# Patient Record
Sex: FEMALE | Race: BLACK OR AFRICAN AMERICAN | NOT HISPANIC OR LATINO | Employment: OTHER | URBAN - NONMETROPOLITAN AREA
[De-identification: names, ages, dates, MRNs, and addresses within clinical notes are randomized per-mention and may not be internally consistent; named-entity substitution may affect disease eponyms.]

---

## 2021-11-12 ENCOUNTER — OFFICE VISIT (OUTPATIENT)
Dept: FAMILY MEDICINE | Facility: CLINIC | Age: 46
End: 2021-11-12
Payer: COMMERCIAL

## 2021-11-12 VITALS
HEART RATE: 73 BPM | SYSTOLIC BLOOD PRESSURE: 82 MMHG | BODY MASS INDEX: 23.7 KG/M2 | TEMPERATURE: 99 F | HEIGHT: 69 IN | RESPIRATION RATE: 96 BRPM | DIASTOLIC BLOOD PRESSURE: 50 MMHG | WEIGHT: 160 LBS

## 2021-11-12 DIAGNOSIS — J02.9 SORE THROAT: ICD-10-CM

## 2021-11-12 DIAGNOSIS — J22 LOWER RESPIRATORY INFECTION: Primary | ICD-10-CM

## 2021-11-12 DIAGNOSIS — Z20.822 EXPOSURE TO COVID-19 VIRUS: ICD-10-CM

## 2021-11-12 DIAGNOSIS — Z20.822 ENCOUNTER FOR LABORATORY TESTING FOR COVID-19 VIRUS: ICD-10-CM

## 2021-11-12 DIAGNOSIS — R52 GENERALIZED BODY ACHES: ICD-10-CM

## 2021-11-12 LAB
CTP QC/QA: YES
CTP QC/QA: YES
FLUAV AG NPH QL: NEGATIVE
FLUBV AG NPH QL: NEGATIVE
S PYO RRNA THROAT QL PROBE: NEGATIVE
SARS-COV-2 AG RESP QL IA.RAPID: NEGATIVE

## 2021-11-12 PROCEDURE — 99203 PR OFFICE/OUTPT VISIT, NEW, LEVL III, 30-44 MIN: ICD-10-PCS | Mod: 25,,, | Performed by: FAMILY MEDICINE

## 2021-11-12 PROCEDURE — 3078F PR MOST RECENT DIASTOLIC BLOOD PRESSURE < 80 MM HG: ICD-10-PCS | Mod: CPTII,,, | Performed by: FAMILY MEDICINE

## 2021-11-12 PROCEDURE — 87428 POCT SARS-COV2 (COVID) WITH FLU ANTIGEN: ICD-10-PCS | Mod: QW,,, | Performed by: FAMILY MEDICINE

## 2021-11-12 PROCEDURE — 99203 OFFICE O/P NEW LOW 30 MIN: CPT | Mod: 25,,, | Performed by: FAMILY MEDICINE

## 2021-11-12 PROCEDURE — 3074F SYST BP LT 130 MM HG: CPT | Mod: CPTII,,, | Performed by: FAMILY MEDICINE

## 2021-11-12 PROCEDURE — 87880 STREP A ASSAY W/OPTIC: CPT | Mod: QW,,, | Performed by: FAMILY MEDICINE

## 2021-11-12 PROCEDURE — 3074F PR MOST RECENT SYSTOLIC BLOOD PRESSURE < 130 MM HG: ICD-10-PCS | Mod: CPTII,,, | Performed by: FAMILY MEDICINE

## 2021-11-12 PROCEDURE — 3008F BODY MASS INDEX DOCD: CPT | Mod: CPTII,,, | Performed by: FAMILY MEDICINE

## 2021-11-12 PROCEDURE — 96372 PR INJECTION,THERAP/PROPH/DIAG2ST, IM OR SUBCUT: ICD-10-PCS | Mod: ,,, | Performed by: FAMILY MEDICINE

## 2021-11-12 PROCEDURE — 87428 SARSCOV & INF VIR A&B AG IA: CPT | Mod: QW,,, | Performed by: FAMILY MEDICINE

## 2021-11-12 PROCEDURE — 87635 SARS-COV-2 (COVID-19) QUALITATIVE PCR: ICD-10-PCS | Mod: ,,, | Performed by: CLINICAL MEDICAL LABORATORY

## 2021-11-12 PROCEDURE — 87880 POCT RAPID STREP A: ICD-10-PCS | Mod: QW,,, | Performed by: FAMILY MEDICINE

## 2021-11-12 PROCEDURE — 1159F MED LIST DOCD IN RCRD: CPT | Mod: CPTII,,, | Performed by: FAMILY MEDICINE

## 2021-11-12 PROCEDURE — 87635 SARS-COV-2 COVID-19 AMP PRB: CPT | Mod: ,,, | Performed by: CLINICAL MEDICAL LABORATORY

## 2021-11-12 PROCEDURE — 1160F PR REVIEW ALL MEDS BY PRESCRIBER/CLIN PHARMACIST DOCUMENTED: ICD-10-PCS | Mod: CPTII,,, | Performed by: FAMILY MEDICINE

## 2021-11-12 PROCEDURE — 1160F RVW MEDS BY RX/DR IN RCRD: CPT | Mod: CPTII,,, | Performed by: FAMILY MEDICINE

## 2021-11-12 PROCEDURE — 3078F DIAST BP <80 MM HG: CPT | Mod: CPTII,,, | Performed by: FAMILY MEDICINE

## 2021-11-12 PROCEDURE — 3044F HG A1C LEVEL LT 7.0%: CPT | Mod: CPTII,,, | Performed by: FAMILY MEDICINE

## 2021-11-12 PROCEDURE — 3044F PR MOST RECENT HEMOGLOBIN A1C LEVEL <7.0%: ICD-10-PCS | Mod: CPTII,,, | Performed by: FAMILY MEDICINE

## 2021-11-12 PROCEDURE — 96372 THER/PROPH/DIAG INJ SC/IM: CPT | Mod: ,,, | Performed by: FAMILY MEDICINE

## 2021-11-12 PROCEDURE — 3008F PR BODY MASS INDEX (BMI) DOCUMENTED: ICD-10-PCS | Mod: CPTII,,, | Performed by: FAMILY MEDICINE

## 2021-11-12 PROCEDURE — 1159F PR MEDICATION LIST DOCUMENTED IN MEDICAL RECORD: ICD-10-PCS | Mod: CPTII,,, | Performed by: FAMILY MEDICINE

## 2021-11-12 RX ORDER — PREDNISONE 20 MG/1
20 TABLET ORAL 2 TIMES DAILY
Qty: 14 TABLET | Refills: 0 | Status: SHIPPED | OUTPATIENT
Start: 2021-11-12 | End: 2023-09-13

## 2021-11-12 RX ORDER — METHYLPREDNISOLONE ACETATE 40 MG/ML
40 INJECTION, SUSPENSION INTRA-ARTICULAR; INTRALESIONAL; INTRAMUSCULAR; SOFT TISSUE
Status: COMPLETED | OUTPATIENT
Start: 2021-11-12 | End: 2021-11-12

## 2021-11-12 RX ORDER — DEXCHLORPHENIRAMINE MALEATE, DEXTROMETHORPHAN HBR, PHENYLEPHRINE HCL 1; 10; 5 MG/5ML; MG/5ML; MG/5ML
5 SYRUP ORAL 3 TIMES DAILY PRN
Qty: 120 ML | Refills: 0 | Status: SHIPPED | OUTPATIENT
Start: 2021-11-12 | End: 2023-02-27

## 2021-11-12 RX ORDER — DEXAMETHASONE SODIUM PHOSPHATE 4 MG/ML
4 INJECTION, SOLUTION INTRA-ARTICULAR; INTRALESIONAL; INTRAMUSCULAR; INTRAVENOUS; SOFT TISSUE
Status: COMPLETED | OUTPATIENT
Start: 2021-11-12 | End: 2021-11-12

## 2021-11-12 RX ORDER — ALBUTEROL SULFATE 90 UG/1
2 AEROSOL, METERED RESPIRATORY (INHALATION) EVERY 6 HOURS PRN
Qty: 18 G | Refills: 0 | Status: SHIPPED | OUTPATIENT
Start: 2021-11-12 | End: 2023-02-27 | Stop reason: SDUPTHER

## 2021-11-12 RX ORDER — LEVOFLOXACIN 500 MG/1
500 TABLET, FILM COATED ORAL DAILY
Qty: 7 TABLET | Refills: 0 | Status: SHIPPED | OUTPATIENT
Start: 2021-11-12 | End: 2023-09-13

## 2021-11-12 RX ORDER — CEFTRIAXONE 1 G/1
1 INJECTION, POWDER, FOR SOLUTION INTRAMUSCULAR; INTRAVENOUS
Status: COMPLETED | OUTPATIENT
Start: 2021-11-12 | End: 2021-11-12

## 2021-11-12 RX ADMIN — CEFTRIAXONE 1 G: 1 INJECTION, POWDER, FOR SOLUTION INTRAMUSCULAR; INTRAVENOUS at 10:11

## 2021-11-12 RX ADMIN — DEXAMETHASONE SODIUM PHOSPHATE 4 MG: 4 INJECTION, SOLUTION INTRA-ARTICULAR; INTRALESIONAL; INTRAMUSCULAR; INTRAVENOUS; SOFT TISSUE at 10:11

## 2021-11-12 RX ADMIN — METHYLPREDNISOLONE ACETATE 40 MG: 40 INJECTION, SUSPENSION INTRA-ARTICULAR; INTRALESIONAL; INTRAMUSCULAR; SOFT TISSUE at 10:11

## 2021-11-13 LAB — SARS-COV-2 RNA RESP QL NAA+PROBE: NEGATIVE

## 2022-07-25 ENCOUNTER — OFFICE VISIT (OUTPATIENT)
Dept: FAMILY MEDICINE | Facility: CLINIC | Age: 47
End: 2022-07-25
Payer: COMMERCIAL

## 2022-07-25 VITALS
HEART RATE: 78 BPM | TEMPERATURE: 98 F | BODY MASS INDEX: 20.88 KG/M2 | WEIGHT: 141 LBS | RESPIRATION RATE: 16 BRPM | OXYGEN SATURATION: 98 % | DIASTOLIC BLOOD PRESSURE: 82 MMHG | SYSTOLIC BLOOD PRESSURE: 127 MMHG | HEIGHT: 69 IN

## 2022-07-25 DIAGNOSIS — Z20.822 COUGH WITH EXPOSURE TO COVID-19 VIRUS: ICD-10-CM

## 2022-07-25 DIAGNOSIS — R05.8 COUGH WITH EXPOSURE TO COVID-19 VIRUS: ICD-10-CM

## 2022-07-25 DIAGNOSIS — R09.81 SINUS CONGESTION: ICD-10-CM

## 2022-07-25 DIAGNOSIS — G43.809 OTHER MIGRAINE WITHOUT STATUS MIGRAINOSUS, NOT INTRACTABLE: Primary | ICD-10-CM

## 2022-07-25 LAB
CTP QC/QA: YES
FLUAV AG NPH QL: NEGATIVE
FLUBV AG NPH QL: NEGATIVE
SARS-COV-2 AG RESP QL IA.RAPID: NEGATIVE

## 2022-07-25 PROCEDURE — 96372 THER/PROPH/DIAG INJ SC/IM: CPT | Mod: ,,, | Performed by: NURSE PRACTITIONER

## 2022-07-25 PROCEDURE — 1159F MED LIST DOCD IN RCRD: CPT | Mod: CPTII,,, | Performed by: NURSE PRACTITIONER

## 2022-07-25 PROCEDURE — 3008F BODY MASS INDEX DOCD: CPT | Mod: CPTII,,, | Performed by: NURSE PRACTITIONER

## 2022-07-25 PROCEDURE — 3079F PR MOST RECENT DIASTOLIC BLOOD PRESSURE 80-89 MM HG: ICD-10-PCS | Mod: CPTII,,, | Performed by: NURSE PRACTITIONER

## 2022-07-25 PROCEDURE — 3008F PR BODY MASS INDEX (BMI) DOCUMENTED: ICD-10-PCS | Mod: CPTII,,, | Performed by: NURSE PRACTITIONER

## 2022-07-25 PROCEDURE — 99213 PR OFFICE/OUTPT VISIT, EST, LEVL III, 20-29 MIN: ICD-10-PCS | Mod: 25,,, | Performed by: NURSE PRACTITIONER

## 2022-07-25 PROCEDURE — 87428 SARSCOV & INF VIR A&B AG IA: CPT | Mod: QW,,, | Performed by: NURSE PRACTITIONER

## 2022-07-25 PROCEDURE — 87428 POCT SARS-COV2 (COVID) WITH FLU ANTIGEN: ICD-10-PCS | Mod: QW,,, | Performed by: NURSE PRACTITIONER

## 2022-07-25 PROCEDURE — 3074F SYST BP LT 130 MM HG: CPT | Mod: CPTII,,, | Performed by: NURSE PRACTITIONER

## 2022-07-25 PROCEDURE — 1159F PR MEDICATION LIST DOCUMENTED IN MEDICAL RECORD: ICD-10-PCS | Mod: CPTII,,, | Performed by: NURSE PRACTITIONER

## 2022-07-25 PROCEDURE — 96372 PR INJECTION,THERAP/PROPH/DIAG2ST, IM OR SUBCUT: ICD-10-PCS | Mod: ,,, | Performed by: NURSE PRACTITIONER

## 2022-07-25 PROCEDURE — 3079F DIAST BP 80-89 MM HG: CPT | Mod: CPTII,,, | Performed by: NURSE PRACTITIONER

## 2022-07-25 PROCEDURE — 99213 OFFICE O/P EST LOW 20 MIN: CPT | Mod: 25,,, | Performed by: NURSE PRACTITIONER

## 2022-07-25 PROCEDURE — 3074F PR MOST RECENT SYSTOLIC BLOOD PRESSURE < 130 MM HG: ICD-10-PCS | Mod: CPTII,,, | Performed by: NURSE PRACTITIONER

## 2022-07-25 RX ORDER — DEXAMETHASONE SODIUM PHOSPHATE 4 MG/ML
4 INJECTION, SOLUTION INTRA-ARTICULAR; INTRALESIONAL; INTRAMUSCULAR; INTRAVENOUS; SOFT TISSUE
Status: COMPLETED | OUTPATIENT
Start: 2022-07-25 | End: 2022-07-25

## 2022-07-25 RX ORDER — LAMOTRIGINE 200 MG/1
200 TABLET ORAL
COMMUNITY
End: 2023-02-27

## 2022-07-25 RX ORDER — CITALOPRAM 20 MG/1
20 TABLET, FILM COATED ORAL
COMMUNITY

## 2022-07-25 RX ORDER — KETOROLAC TROMETHAMINE 30 MG/ML
60 INJECTION, SOLUTION INTRAMUSCULAR; INTRAVENOUS
Status: COMPLETED | OUTPATIENT
Start: 2022-07-25 | End: 2022-07-25

## 2022-07-25 RX ORDER — CYCLOBENZAPRINE HCL 10 MG
10 TABLET ORAL
COMMUNITY
Start: 2022-06-29 | End: 2023-02-27

## 2022-07-25 RX ORDER — METHYLPREDNISOLONE ACETATE 40 MG/ML
40 INJECTION, SUSPENSION INTRA-ARTICULAR; INTRALESIONAL; INTRAMUSCULAR; SOFT TISSUE
Status: COMPLETED | OUTPATIENT
Start: 2022-07-25 | End: 2022-07-25

## 2022-07-25 RX ORDER — DEXTROAMPHETAMINE SACCHARATE, AMPHETAMINE ASPARTATE, DEXTROAMPHETAMINE SULFATE AND AMPHETAMINE SULFATE 7.5; 7.5; 7.5; 7.5 MG/1; MG/1; MG/1; MG/1
30 TABLET ORAL
COMMUNITY

## 2022-07-25 RX ADMIN — KETOROLAC TROMETHAMINE 60 MG: 30 INJECTION, SOLUTION INTRAMUSCULAR; INTRAVENOUS at 09:07

## 2022-07-25 RX ADMIN — DEXAMETHASONE SODIUM PHOSPHATE 4 MG: 4 INJECTION, SOLUTION INTRA-ARTICULAR; INTRALESIONAL; INTRAMUSCULAR; INTRAVENOUS; SOFT TISSUE at 09:07

## 2022-07-25 RX ADMIN — METHYLPREDNISOLONE ACETATE 40 MG: 40 INJECTION, SUSPENSION INTRA-ARTICULAR; INTRALESIONAL; INTRAMUSCULAR; SOFT TISSUE at 09:07

## 2023-02-27 ENCOUNTER — PATIENT MESSAGE (OUTPATIENT)
Dept: FAMILY MEDICINE | Facility: CLINIC | Age: 48
End: 2023-02-27
Payer: MEDICARE

## 2023-02-27 ENCOUNTER — OFFICE VISIT (OUTPATIENT)
Dept: FAMILY MEDICINE | Facility: CLINIC | Age: 48
End: 2023-02-27
Payer: MEDICARE

## 2023-02-27 VITALS
HEART RATE: 96 BPM | SYSTOLIC BLOOD PRESSURE: 121 MMHG | BODY MASS INDEX: 18.37 KG/M2 | HEIGHT: 69 IN | DIASTOLIC BLOOD PRESSURE: 75 MMHG | WEIGHT: 124 LBS | OXYGEN SATURATION: 98 %

## 2023-02-27 DIAGNOSIS — Z79.899 DRUG-INDUCED IMMUNODEFICIENCY: Chronic | ICD-10-CM

## 2023-02-27 DIAGNOSIS — F31.0 BIPOLAR DISORDER, MOST RECENT EPISODE HYPOMANIC: Chronic | ICD-10-CM

## 2023-02-27 DIAGNOSIS — Z11.4 SCREENING FOR HIV (HUMAN IMMUNODEFICIENCY VIRUS): ICD-10-CM

## 2023-02-27 DIAGNOSIS — R63.4 ABNORMAL WEIGHT LOSS: Primary | Chronic | ICD-10-CM

## 2023-02-27 DIAGNOSIS — Z87.891 HISTORY OF TOBACCO USE: ICD-10-CM

## 2023-02-27 DIAGNOSIS — M06.09 RHEUMATOID ARTHRITIS OF MULTIPLE SITES WITH NEGATIVE RHEUMATOID FACTOR: Chronic | ICD-10-CM

## 2023-02-27 DIAGNOSIS — F17.200 TOBACCO DEPENDENCE SYNDROME: ICD-10-CM

## 2023-02-27 DIAGNOSIS — D84.821 DRUG-INDUCED IMMUNODEFICIENCY: Chronic | ICD-10-CM

## 2023-02-27 DIAGNOSIS — Z11.59 ENCOUNTER FOR HEPATITIS C SCREENING TEST FOR LOW RISK PATIENT: ICD-10-CM

## 2023-02-27 PROBLEM — Z51.81 THERAPEUTIC DRUG MONITORING: Chronic | Status: ACTIVE | Noted: 2022-06-29

## 2023-02-27 PROBLEM — K11.7 XEROSTOMIA: Chronic | Status: ACTIVE | Noted: 2023-02-07

## 2023-02-27 PROBLEM — G89.4 CHRONIC PAIN SYNDROME: Status: ACTIVE | Noted: 2022-06-29

## 2023-02-27 PROBLEM — Z51.81 THERAPEUTIC DRUG MONITORING: Status: ACTIVE | Noted: 2022-06-29

## 2023-02-27 PROBLEM — M79.7 FIBROMYALGIA: Status: ACTIVE | Noted: 2022-06-29

## 2023-02-27 PROBLEM — J22 LOWER RESPIRATORY INFECTION: Status: RESOLVED | Noted: 2021-11-12 | Resolved: 2023-02-27

## 2023-02-27 PROBLEM — K11.7 XEROSTOMIA: Status: ACTIVE | Noted: 2023-02-07

## 2023-02-27 PROBLEM — M79.7 FIBROMYALGIA: Chronic | Status: ACTIVE | Noted: 2022-06-29

## 2023-02-27 PROBLEM — Z20.822 EXPOSURE TO COVID-19 VIRUS: Status: RESOLVED | Noted: 2021-11-12 | Resolved: 2023-02-27

## 2023-02-27 PROBLEM — G89.4 CHRONIC PAIN SYNDROME: Chronic | Status: ACTIVE | Noted: 2022-06-29

## 2023-02-27 PROBLEM — M13.0 POLYARTHRITIS: Chronic | Status: ACTIVE | Noted: 2022-06-29

## 2023-02-27 PROBLEM — M13.0 POLYARTHRITIS: Status: ACTIVE | Noted: 2022-06-29

## 2023-02-27 PROBLEM — M25.50 PAIN IN JOINTS: Status: ACTIVE | Noted: 2022-06-29

## 2023-02-27 PROBLEM — M25.50 PAIN IN JOINTS: Chronic | Status: ACTIVE | Noted: 2022-06-29

## 2023-02-27 LAB
BASOPHILS # BLD AUTO: 0.04 K/UL (ref 0–0.2)
BASOPHILS NFR BLD AUTO: 0.4 % (ref 0–1)
BILIRUB UR QL STRIP: NEGATIVE
CLARITY UR: CLEAR
COLOR UR: NORMAL
DIFFERENTIAL METHOD BLD: ABNORMAL
EOSINOPHIL # BLD AUTO: 0.02 K/UL (ref 0–0.5)
EOSINOPHIL NFR BLD AUTO: 0.2 % (ref 1–4)
ERYTHROCYTE [DISTWIDTH] IN BLOOD BY AUTOMATED COUNT: 13.5 % (ref 11.5–14.5)
GLUCOSE UR STRIP-MCNC: NORMAL MG/DL
HCT VFR BLD AUTO: 41.1 % (ref 38–47)
HGB BLD-MCNC: 13.5 G/DL (ref 12–16)
IMM GRANULOCYTES # BLD AUTO: 0.02 K/UL (ref 0–0.04)
IMM GRANULOCYTES NFR BLD: 0.2 % (ref 0–0.4)
KETONES UR STRIP-SCNC: NEGATIVE MG/DL
LEUKOCYTE ESTERASE UR QL STRIP: NEGATIVE
LYMPHOCYTES # BLD AUTO: 4.74 K/UL (ref 1–4.8)
LYMPHOCYTES NFR BLD AUTO: 48.9 % (ref 27–41)
MCH RBC QN AUTO: 32.8 PG (ref 27–31)
MCHC RBC AUTO-ENTMCNC: 32.8 G/DL (ref 32–36)
MCV RBC AUTO: 100 FL (ref 80–96)
MONOCYTES # BLD AUTO: 0.76 K/UL (ref 0–0.8)
MONOCYTES NFR BLD AUTO: 7.8 % (ref 2–6)
MPC BLD CALC-MCNC: 11.4 FL (ref 9.4–12.4)
NEUTROPHILS # BLD AUTO: 4.11 K/UL (ref 1.8–7.7)
NEUTROPHILS NFR BLD AUTO: 42.5 % (ref 53–65)
NITRITE UR QL STRIP: NEGATIVE
NRBC # BLD AUTO: 0 X10E3/UL
NRBC, AUTO (.00): 0 %
PH UR STRIP: 6.5 PH UNITS
PLATELET # BLD AUTO: 280 K/UL (ref 150–400)
PROT UR QL STRIP: NEGATIVE
RBC # BLD AUTO: 4.11 M/UL (ref 4.2–5.4)
RBC # UR STRIP: NEGATIVE /UL
SP GR UR STRIP: 1.01
UROBILINOGEN UR STRIP-ACNC: NORMAL MG/DL
WBC # BLD AUTO: 9.69 K/UL (ref 4.5–11)

## 2023-02-27 PROCEDURE — 87389 HIV 1 / 2 ANTIBODY: ICD-10-PCS | Mod: ,,, | Performed by: CLINICAL MEDICAL LABORATORY

## 2023-02-27 PROCEDURE — 81003 URINALYSIS, REFLEX TO URINE CULTURE: ICD-10-PCS | Mod: QW,,, | Performed by: CLINICAL MEDICAL LABORATORY

## 2023-02-27 PROCEDURE — 3008F BODY MASS INDEX DOCD: CPT | Mod: ,,, | Performed by: FAMILY MEDICINE

## 2023-02-27 PROCEDURE — 3074F PR MOST RECENT SYSTOLIC BLOOD PRESSURE < 130 MM HG: ICD-10-PCS | Mod: ,,, | Performed by: FAMILY MEDICINE

## 2023-02-27 PROCEDURE — 86803 HEPATITIS C AB TEST: CPT | Mod: ,,, | Performed by: CLINICAL MEDICAL LABORATORY

## 2023-02-27 PROCEDURE — 1160F PR REVIEW ALL MEDS BY PRESCRIBER/CLIN PHARMACIST DOCUMENTED: ICD-10-PCS | Mod: ,,, | Performed by: FAMILY MEDICINE

## 2023-02-27 PROCEDURE — 87522 HEPATITIS C REVRS TRNSCRPJ: CPT | Mod: 90,,, | Performed by: CLINICAL MEDICAL LABORATORY

## 2023-02-27 PROCEDURE — G0008 FLU VACCINE (QUAD) GREATER THAN OR EQUAL TO 3YO PRESERVATIVE FREE IM: ICD-10-PCS | Mod: ,,, | Performed by: FAMILY MEDICINE

## 2023-02-27 PROCEDURE — 90686 IIV4 VACC NO PRSV 0.5 ML IM: CPT | Mod: ,,, | Performed by: FAMILY MEDICINE

## 2023-02-27 PROCEDURE — 3078F DIAST BP <80 MM HG: CPT | Mod: ,,, | Performed by: FAMILY MEDICINE

## 2023-02-27 PROCEDURE — 87389 HIV-1 AG W/HIV-1&-2 AB AG IA: CPT | Mod: ,,, | Performed by: CLINICAL MEDICAL LABORATORY

## 2023-02-27 PROCEDURE — G0008 ADMIN INFLUENZA VIRUS VAC: HCPCS | Mod: ,,, | Performed by: FAMILY MEDICINE

## 2023-02-27 PROCEDURE — 3074F SYST BP LT 130 MM HG: CPT | Mod: ,,, | Performed by: FAMILY MEDICINE

## 2023-02-27 PROCEDURE — 99406 BEHAV CHNG SMOKING 3-10 MIN: CPT | Mod: ,,, | Performed by: FAMILY MEDICINE

## 2023-02-27 PROCEDURE — 80050 PR  GENERAL HEALTH PANEL: ICD-10-PCS | Mod: ,,, | Performed by: CLINICAL MEDICAL LABORATORY

## 2023-02-27 PROCEDURE — 81003 URINALYSIS AUTO W/O SCOPE: CPT | Mod: QW,,, | Performed by: CLINICAL MEDICAL LABORATORY

## 2023-02-27 PROCEDURE — 99214 PR OFFICE/OUTPT VISIT, EST, LEVL IV, 30-39 MIN: ICD-10-PCS | Mod: 25,,, | Performed by: FAMILY MEDICINE

## 2023-02-27 PROCEDURE — 86803 HEPATITIS C ANTIBODY: ICD-10-PCS | Mod: ,,, | Performed by: CLINICAL MEDICAL LABORATORY

## 2023-02-27 PROCEDURE — 3078F PR MOST RECENT DIASTOLIC BLOOD PRESSURE < 80 MM HG: ICD-10-PCS | Mod: ,,, | Performed by: FAMILY MEDICINE

## 2023-02-27 PROCEDURE — 1160F RVW MEDS BY RX/DR IN RCRD: CPT | Mod: ,,, | Performed by: FAMILY MEDICINE

## 2023-02-27 PROCEDURE — 1159F PR MEDICATION LIST DOCUMENTED IN MEDICAL RECORD: ICD-10-PCS | Mod: ,,, | Performed by: FAMILY MEDICINE

## 2023-02-27 PROCEDURE — 90686 FLU VACCINE (QUAD) GREATER THAN OR EQUAL TO 3YO PRESERVATIVE FREE IM: ICD-10-PCS | Mod: ,,, | Performed by: FAMILY MEDICINE

## 2023-02-27 PROCEDURE — 87522 HCV MONITOR, COBAS AMPLICOR: ICD-10-PCS | Mod: 90,,, | Performed by: CLINICAL MEDICAL LABORATORY

## 2023-02-27 PROCEDURE — 3008F PR BODY MASS INDEX (BMI) DOCUMENTED: ICD-10-PCS | Mod: ,,, | Performed by: FAMILY MEDICINE

## 2023-02-27 PROCEDURE — 99406 PR TOBACCO USE CESSATION INTERMEDIATE 3-10 MINUTES: ICD-10-PCS | Mod: ,,, | Performed by: FAMILY MEDICINE

## 2023-02-27 PROCEDURE — 1159F MED LIST DOCD IN RCRD: CPT | Mod: ,,, | Performed by: FAMILY MEDICINE

## 2023-02-27 PROCEDURE — 80050 GENERAL HEALTH PANEL: CPT | Mod: ,,, | Performed by: CLINICAL MEDICAL LABORATORY

## 2023-02-27 PROCEDURE — 99214 OFFICE O/P EST MOD 30 MIN: CPT | Mod: 25,,, | Performed by: FAMILY MEDICINE

## 2023-02-27 RX ORDER — DOXYCYCLINE HYCLATE 100 MG
TABLET ORAL
COMMUNITY
Start: 2022-10-06 | End: 2023-10-31

## 2023-02-27 RX ORDER — PRAZOSIN HYDROCHLORIDE 2 MG/1
2 CAPSULE ORAL
COMMUNITY
End: 2023-10-31

## 2023-02-27 RX ORDER — ETODOLAC 500 MG/1
TABLET, FILM COATED ORAL
COMMUNITY
Start: 2023-02-07 | End: 2023-10-31

## 2023-02-27 RX ORDER — FOLIC ACID 1 MG/1
1 TABLET ORAL
COMMUNITY
Start: 2023-02-07 | End: 2024-02-07

## 2023-02-27 RX ORDER — HYDROXYCHLOROQUINE SULFATE 200 MG/1
200 TABLET, FILM COATED ORAL
COMMUNITY
Start: 2023-02-07 | End: 2024-02-07

## 2023-02-27 RX ORDER — GABAPENTIN 300 MG/1
300 CAPSULE ORAL
COMMUNITY
Start: 2022-09-12 | End: 2023-09-13

## 2023-02-27 RX ORDER — PILOCARPINE HYDROCHLORIDE 5 MG/1
5 TABLET, FILM COATED ORAL
COMMUNITY
Start: 2023-02-07 | End: 2023-09-13

## 2023-02-27 RX ORDER — NABUMETONE 750 MG/1
750 TABLET, FILM COATED ORAL
COMMUNITY
Start: 2022-07-27 | End: 2023-09-13

## 2023-02-27 RX ORDER — BENZTROPINE MESYLATE 1 MG/1
TABLET ORAL
COMMUNITY
Start: 2023-01-19 | End: 2023-09-13

## 2023-02-27 RX ORDER — ROPINIROLE 0.5 MG/1
0.5 TABLET, FILM COATED ORAL
COMMUNITY
End: 2023-09-13

## 2023-02-27 NOTE — PROGRESS NOTES
Sp Ariza MD    [address] [place] [address]     PATIENT NAME: Ratna Amin  : 1975  DATE: 23  MRN: [id number]      Billing Provider: Sp Ariza MD  Level of Service: ID OFFICE/OUTPT VISIT, EST, LEVL IV, 30-39 MIN  Patient PCP Information       Provider PCP Type    Primary Doctor No General            Reason for Visit / Chief Complaint: Follow-up (Pt is here to discuss her lab results. She also wanted to see about getting a wellness check today. )       Update PCP  Update Chief Complaint         History of Present Illness / Problem Focused Workflow     Ratna Amin presents to the clinic with Follow-up (Pt is here to discuss her lab results. She also wanted to see about getting a wellness check today. )     Weight loss - she has lost about 40 pounds in the past 1 year, she states she is very stressed, has bipolar and is on adderal for focus    Fatigue - she feels tired all the time, started about 3 months after an illness, she tested positive for flu at that time, since then she has not felt normal     Rheum problems - she follows with Dr. Goldsmith in [place]     Diabetes - family history     Bipolar - she follows with Mayelin     [facility]    Review of Systems     Review of Systems   Constitutional:  Positive for activity change and unexpected weight change.   HENT:  Negative for hearing loss, rhinorrhea and trouble swallowing.    Eyes:  Positive for visual disturbance. Negative for discharge.   Respiratory:  Negative for chest tightness and wheezing.    Cardiovascular:  Negative for chest pain and palpitations.   Gastrointestinal:  Negative for blood in stool, constipation, diarrhea and vomiting.   Endocrine: Positive for polydipsia and polyuria.   Genitourinary:  Negative for difficulty urinating, dysuria, hematuria and menstrual problem.   Musculoskeletal:  Positive for arthralgias and neck pain. Negative for joint swelling.    Neurological:  Positive for weakness and headaches.   Psychiatric/Behavioral:  Positive for confusion and dysphoric mood.       Medical / Social / Family History     Past Medical History:   Diagnosis Date    Bipolar disorder        Past Surgical History:   Procedure Laterality Date    CHOLECYSTECTOMY      HYSTERECTOMY      TUBAL LIGATION         Social History  Ms.  reports that she has been smoking cigarettes. She has been smoking an average of 1 pack per day. She has never used smokeless tobacco. She reports that she does not currently use alcohol. She reports that she does not use drugs.    Family History  Ms.'s family history includes Cancer in her father and mother; Diabetes in her mother; Hypertension in her father; Mental illness in her father.    Medications and Allergies     Medications  Outpatient Medications Marked as Taking for the 2/27/23 encounter (Office Visit) with Sp Ariza MD   Medication Sig Dispense Refill    ARIPiprazole (ABILIFY) 400 mg SSRR injection (single-use vial) Inject 400 mg into the muscle.      benztropine (COGENTIN) 1 MG tablet Take by mouth.      citalopram (CELEXA) 20 MG tablet Take 20 mg by mouth.      dextroamphetamine-amphetamine 30 mg Tab Take 30 mg by mouth.      doxycycline (VIBRA-TABS) 100 MG tablet Take by mouth.      etodolac (LODINE) 500 MG tablet Take by mouth.      folic acid (FOLVITE) 1 MG tablet Take 1 mg by mouth.      gabapentin (NEURONTIN) 300 MG capsule Take 300 mg by mouth.      hydrOXYchloroQUINE (PLAQUENIL) 200 mg tablet Take 200 mg by mouth.      levoFLOXacin (LEVAQUIN) 500 MG tablet Take 1 tablet (500 mg total) by mouth once daily. 7 tablet 0    methotrexate (TREXALL) 10 MG tablet Take 10 mg by mouth.      nabumetone (RELAFEN) 750 MG tablet Take 750 mg by mouth.      pilocarpine (SALAGEN) 5 MG Tab Take 5 mg by mouth.      prazosin (MINIPRESS) 2 MG Cap Take 2 mg by mouth.      predniSONE (DELTASONE) 20 MG tablet Take 1 tablet (20 mg total) by mouth  2 (two) times daily. 14 tablet 0    rOPINIRole (REQUIP) 0.5 MG tablet Take 0.5 mg by mouth.         Allergies  Review of patient's allergies indicates:   Allergen Reactions    Penicillins        Physical Examination     Vitals:    02/27/23 1546   BP: 121/75   Pulse: 96     Physical Exam       Assessment and Plan (including Health Maintenance)      Problem List  Smart Sets  Document Outside HM   :    Plan:         Health Maintenance Due   Topic Date Due    Pneumococcal Vaccines (Age 0-64) (1 - PCV) Never done    TETANUS VACCINE  Never done    Mammogram  Never done    COVID-19 Vaccine (2 - Booster for Ham[place] series) 06/03/2021    Colorectal Cancer Screening  Never done       Problem List Items Addressed This Visit          Psychiatric    Bipolar disorder, most recent episode hypomanic (Chronic)    Current Assessment & Plan     She appears a bit anxious today but this is my first visit with her. Denies any major mood related events lately, no changes in her treatment today.             Immunology/Multi System    Drug-induced immunodeficiency (Chronic)    Current Assessment & Plan     Likely related to treatment of her Rheum condition. This will need to be closely monitored over time.          Rheumatoid arthritis of multiple sites with negative rheumatoid factor (Chronic)    Current Assessment & Plan     Appreciated this in her chart review. She has been on treatment for it for an extended period of time, has been followed by Rheumatology. I recommend she continue to follow with them.                 Endocrine    Abnormal weight loss - Primary (Chronic)    Current Assessment & Plan     Labs ordered today, unknown etiology.          Relevant Orders    Comprehensive Metabolic Panel (Completed)    CBC Auto Differential (Completed)    Urinalysis, Reflex to Urine Culture (Completed)    TSH (Completed)     Other Visit Diagnoses       Screening for HIV (human immunodeficiency virus)        Relevant Orders    HIV 1/2 Ag/Ab  (4th Gen) (Completed)    Encounter for hepatitis C screening test for low risk patient        Relevant Orders    Hepatitis C Antibody (Completed)    Hepatitis C Virus Quantitative    History of tobacco use        Relevant Orders    [99460] MO TOBACCO USE CESSATION INTERMEDIATE 3-10 MIN    Tobacco dependence syndrome        Relevant Orders    [79713] MO TOBACCO USE CESSATION INTERMEDIATE 3-10 MIN            Health Maintenance Topics with due status: Not Due       Topic Last Completion Date    Lipid Panel 06/23/2021       Procedures     Future Appointments   Date Time Provider Department Center   3/3/2023 12:30 PM Sp Ariza MD [facility]   8/29/2023  9:30 AM Blanche Chavez MD [facility]        Follow up in about 1 month (around 3/27/2023) for chronic health problems.     Signature:  Sp Ariza MD  [address] [place] [address]  Phone #: [phone]  Fax #: [phone]    Date of encounter: 2/27/23    There are no Patient Instructions on file for this visit.       Tobacco Use/Cessation:  I assessed Ratna Amin and discussed smoking cessation with her for 3-10 minutes. She is not prepared to stop smoking at this time.     She    Social History     Tobacco Use   Smoking Status Every Day    Packs/day: 1.00    Types: Cigarettes   Smokeless Tobacco Never

## 2023-02-28 ENCOUNTER — OFFICE VISIT (OUTPATIENT)
Dept: FAMILY MEDICINE | Facility: CLINIC | Age: 48
End: 2023-02-28
Payer: MEDICARE

## 2023-02-28 VITALS
WEIGHT: 124 LBS | HEART RATE: 78 BPM | BODY MASS INDEX: 18.37 KG/M2 | HEIGHT: 69 IN | DIASTOLIC BLOOD PRESSURE: 71 MMHG | RESPIRATION RATE: 16 BRPM | SYSTOLIC BLOOD PRESSURE: 118 MMHG | OXYGEN SATURATION: 98 % | TEMPERATURE: 97 F

## 2023-02-28 DIAGNOSIS — R63.4 ABNORMAL WEIGHT LOSS: Primary | Chronic | ICD-10-CM

## 2023-02-28 LAB
ALBUMIN SERPL BCP-MCNC: 4.2 G/DL (ref 3.5–5)
ALBUMIN/GLOB SERPL: 1.1 {RATIO}
ALP SERPL-CCNC: 67 U/L (ref 39–100)
ALT SERPL W P-5'-P-CCNC: 18 U/L (ref 13–56)
ANION GAP SERPL CALCULATED.3IONS-SCNC: 6 MMOL/L (ref 7–16)
AST SERPL W P-5'-P-CCNC: 18 U/L (ref 15–37)
BILIRUB SERPL-MCNC: 0.4 MG/DL (ref ?–1.2)
BUN SERPL-MCNC: 5 MG/DL (ref 7–18)
BUN/CREAT SERPL: 6 (ref 6–20)
CALCIUM SERPL-MCNC: 10.2 MG/DL (ref 8.5–10.1)
CHLORIDE SERPL-SCNC: 108 MMOL/L (ref 98–107)
CO2 SERPL-SCNC: 30 MMOL/L (ref 21–32)
CREAT SERPL-MCNC: 0.78 MG/DL (ref 0.55–1.02)
EGFR (NO RACE VARIABLE) (RUSH/TITUS): 96 ML/MIN/1.73M²
GLOBULIN SER-MCNC: 4 G/DL (ref 2–4)
GLUCOSE SERPL-MCNC: 80 MG/DL (ref 74–106)
HCV AB SER QL: REACTIVE
HIV 1+O+2 AB SERPL QL: NORMAL
POTASSIUM SERPL-SCNC: 3.5 MMOL/L (ref 3.5–5.1)
PROT SERPL-MCNC: 8.2 G/DL (ref 6.4–8.2)
SODIUM SERPL-SCNC: 140 MMOL/L (ref 136–145)
TSH SERPL DL<=0.005 MIU/L-ACNC: 1.93 UIU/ML (ref 0.36–3.74)

## 2023-02-28 PROCEDURE — 3078F DIAST BP <80 MM HG: CPT | Mod: ,,, | Performed by: FAMILY MEDICINE

## 2023-02-28 PROCEDURE — 3074F SYST BP LT 130 MM HG: CPT | Mod: ,,, | Performed by: FAMILY MEDICINE

## 2023-02-28 PROCEDURE — 99213 OFFICE O/P EST LOW 20 MIN: CPT | Mod: ,,, | Performed by: FAMILY MEDICINE

## 2023-02-28 PROCEDURE — 3078F PR MOST RECENT DIASTOLIC BLOOD PRESSURE < 80 MM HG: ICD-10-PCS | Mod: ,,, | Performed by: FAMILY MEDICINE

## 2023-02-28 PROCEDURE — 3008F PR BODY MASS INDEX (BMI) DOCUMENTED: ICD-10-PCS | Mod: ,,, | Performed by: FAMILY MEDICINE

## 2023-02-28 PROCEDURE — 3074F PR MOST RECENT SYSTOLIC BLOOD PRESSURE < 130 MM HG: ICD-10-PCS | Mod: ,,, | Performed by: FAMILY MEDICINE

## 2023-02-28 PROCEDURE — 99213 PR OFFICE/OUTPT VISIT, EST, LEVL III, 20-29 MIN: ICD-10-PCS | Mod: ,,, | Performed by: FAMILY MEDICINE

## 2023-02-28 PROCEDURE — 3008F BODY MASS INDEX DOCD: CPT | Mod: ,,, | Performed by: FAMILY MEDICINE

## 2023-02-28 NOTE — ASSESSMENT & PLAN NOTE
She appears a bit anxious today but this is my first visit with her. Denies any major mood related events lately, no changes in her treatment today.

## 2023-02-28 NOTE — ASSESSMENT & PLAN NOTE
Likely related to treatment of her Rheum condition. This will need to be closely monitored over time.

## 2023-02-28 NOTE — ASSESSMENT & PLAN NOTE
Appreciated this in her chart review. She has been on treatment for it for an extended period of time, has been followed by Rheumatology. I recommend she continue to follow with them.

## 2023-02-28 NOTE — PROGRESS NOTES
Sp Ariza MD    [address] [place] [address]     PATIENT NAME: Ratna Amin  : 1975  DATE: 23  MRN: [id number]      Billing Provider: Sp Ariza MD  Level of Service: [place] OFFICE/OUTPT VISIT, EST, LEVL III, 20-29 MIN  Patient PCP Information       Provider PCP Type    Primary Doctor No General            Reason for Visit / Chief Complaint: Follow-up (Talk about lab results from yesterday visit.)       Update PCP  Update Chief Complaint         History of Present Illness / Problem Focused Workflow     Ratna Amin presents to the clinic with Follow-up (Talk about lab results from yesterday visit.)     Her initial screen for Hepatitis C was positive. She has a confirmatory test pending     Nothing obvious on labs that would explain significant weight loss        HPI    Review of Systems     Review of Systems   Constitutional:  Positive for activity change and unexpected weight change. Negative for appetite change, chills, fatigue and fever.   HENT:  Negative for nasal congestion, ear pain, hearing loss, postnasal drip, rhinorrhea, sore throat and trouble swallowing.    Eyes:  Negative for discharge and visual disturbance.   Respiratory:  Negative for cough, chest tightness, shortness of breath and wheezing.    Cardiovascular:  Negative for chest pain, palpitations, leg swelling and claudication.   Gastrointestinal:  Negative for abdominal pain, blood in stool, change in bowel habit, constipation, diarrhea, nausea, vomiting and change in bowel habit.   Endocrine: Positive for polydipsia and polyuria.   Genitourinary:  Negative for difficulty urinating, dysuria, hematuria and menstrual problem.   Musculoskeletal:  Positive for arthralgias. Negative for back pain, gait problem, joint swelling, myalgias and neck pain.   Integumentary:  Negative for rash.   Neurological:  Positive for weakness and headaches.   Psychiatric/Behavioral:   Positive for confusion and dysphoric mood. Negative for suicidal ideas. The patient is not nervous/anxious.       Medical / Social / Family History     Past Medical History:   Diagnosis Date    Bipolar disorder        Past Surgical History:   Procedure Laterality Date    CHOLECYSTECTOMY      HYSTERECTOMY      TUBAL LIGATION         Social History  Ms.  reports that she has been smoking cigarettes. She has been smoking an average of 1 pack per day. She has never used smokeless tobacco. She reports that she does not currently use alcohol. She reports that she does not use drugs.    Family History  Ms.'s family history includes Cancer in her father and mother; Diabetes in her mother; Hypertension in her father; Mental illness in her father.    Medications and Allergies     Medications  No outpatient medications have been marked as taking for the 2/28/23 encounter (Office Visit) with Sp Ariza MD.       Allergies  Review of patient's allergies indicates:   Allergen Reactions    Penicillins        Physical Examination     Vitals:    02/28/23 0831   BP: 118/71   Pulse: 78   Resp: 16   Temp: 97 °F (36.1 °C)     Physical Exam  Vitals and nursing note reviewed.   Constitutional:       General: She is not in acute distress.     Appearance: Normal appearance. She is not ill-appearing.   Eyes:      Extraocular Movements: Extraocular movements intact.      Pupils: Pupils are equal, round, and reactive to light.   Cardiovascular:      Rate and Rhythm: Normal rate and regular rhythm.      Heart sounds: Normal heart sounds.   Pulmonary:      Effort: Pulmonary effort is normal.      Breath sounds: Normal breath sounds.   Abdominal:      General: Bowel sounds are normal.      Palpations: Abdomen is soft.   Musculoskeletal:         General: Normal range of motion.   Skin:     Findings: No rash.   Neurological:      General: No focal deficit present.      Mental Status: She is alert and oriented to person, place, and time.  Mental status is at baseline.   Psychiatric:         Mood and Affect: Mood normal.         Behavior: Behavior normal.          Assessment and Plan (including Health Maintenance)      Problem List  Smart Sets  Document Outside HM   :    Plan:         Health Maintenance Due   Topic Date Due    Pneumococcal Vaccines (Age 0-64) (1 - PCV) Never done    TETANUS VACCINE  Never done    Mammogram  Never done    COVID-19 Vaccine (2 - Booster for [place] series) 06/03/2021    Colorectal Cancer Screening  Never done       Problem List Items Addressed This Visit          Endocrine    Abnormal weight loss - Primary (Chronic)    Current Assessment & Plan     Labs did not reveal an obvious cause for her weight loss.             Health Maintenance Topics with due status: Not Due       Topic Last Completion Date    Lipid Panel 06/23/2021       Procedures     Future Appointments   Date Time Provider Department Center   8/29/2023  9:30 AM Blanche Chavez MD [facility]        Follow up if symptoms worsen or fail to improve.     Signature:  Sp Ariza MD  [address] [place] [address]  Phone #: [phone]  Fax #: [phone]    Date of encounter: 2/28/23    There are no Patient Instructions on file for this visit.

## 2023-03-01 LAB — HCV RNA SERPL NAA+PROBE-ACNC: NORMAL IU/ML

## 2023-09-13 ENCOUNTER — OFFICE VISIT (OUTPATIENT)
Dept: FAMILY MEDICINE | Facility: CLINIC | Age: 48
End: 2023-09-13
Payer: MEDICARE

## 2023-09-13 VITALS
RESPIRATION RATE: 18 BRPM | HEIGHT: 69 IN | DIASTOLIC BLOOD PRESSURE: 79 MMHG | OXYGEN SATURATION: 97 % | WEIGHT: 127 LBS | BODY MASS INDEX: 18.81 KG/M2 | HEART RATE: 91 BPM | TEMPERATURE: 98 F | SYSTOLIC BLOOD PRESSURE: 117 MMHG

## 2023-09-13 DIAGNOSIS — Z12.31 ENCOUNTER FOR SCREENING MAMMOGRAM FOR MALIGNANT NEOPLASM OF BREAST: ICD-10-CM

## 2023-09-13 DIAGNOSIS — E46 UNSPECIFIED PROTEIN-CALORIE MALNUTRITION: ICD-10-CM

## 2023-09-13 DIAGNOSIS — M06.09 RHEUMATOID ARTHRITIS OF MULTIPLE SITES WITH NEGATIVE RHEUMATOID FACTOR: ICD-10-CM

## 2023-09-13 DIAGNOSIS — Z12.11 SCREENING FOR MALIGNANT NEOPLASM OF COLON: ICD-10-CM

## 2023-09-13 DIAGNOSIS — Z23 VACCINE FOR STREPTOCOCCUS PNEUMONIAE AND INFLUENZA: ICD-10-CM

## 2023-09-13 DIAGNOSIS — Z13.220 SCREENING, LIPID: ICD-10-CM

## 2023-09-13 DIAGNOSIS — Z00.00 ENCOUNTER FOR GENERAL ADULT MEDICAL EXAMINATION WITHOUT ABNORMAL FINDINGS: Primary | ICD-10-CM

## 2023-09-13 DIAGNOSIS — Z13.1 SCREENING FOR DIABETES MELLITUS: ICD-10-CM

## 2023-09-13 LAB
CHOLEST SERPL-MCNC: 144 MG/DL (ref 0–200)
CHOLEST/HDLC SERPL: 2.1 {RATIO}
GLUCOSE SERPL-MCNC: 73 MG/DL (ref 74–106)
HDLC SERPL-MCNC: 69 MG/DL (ref 40–60)
LDLC SERPL CALC-MCNC: 66 MG/DL
LDLC/HDLC SERPL: 1 {RATIO}
NONHDLC SERPL-MCNC: 75 MG/DL
TRIGL SERPL-MCNC: 47 MG/DL (ref 35–150)
VLDLC SERPL-MCNC: 9 MG/DL

## 2023-09-13 PROCEDURE — G0009 ADMIN PNEUMOCOCCAL VACCINE: HCPCS | Mod: ,,, | Performed by: NURSE PRACTITIONER

## 2023-09-13 PROCEDURE — 80061 LIPID PANEL: ICD-10-PCS | Mod: ,,, | Performed by: CLINICAL MEDICAL LABORATORY

## 2023-09-13 PROCEDURE — G0008 PR ADMIN INFLUENZA VIRUS VAC: ICD-10-PCS | Mod: ,,, | Performed by: NURSE PRACTITIONER

## 2023-09-13 PROCEDURE — 99214 PR OFFICE/OUTPT VISIT, EST, LEVL IV, 30-39 MIN: ICD-10-PCS | Mod: ,,, | Performed by: NURSE PRACTITIONER

## 2023-09-13 PROCEDURE — G0009 FLU VACCINE (QUAD) GREATER THAN OR EQUAL TO 3YO PRESERVATIVE FREE IM: ICD-10-PCS | Mod: ,,, | Performed by: NURSE PRACTITIONER

## 2023-09-13 PROCEDURE — 3074F SYST BP LT 130 MM HG: CPT | Mod: ,,, | Performed by: NURSE PRACTITIONER

## 2023-09-13 PROCEDURE — 3008F BODY MASS INDEX DOCD: CPT | Mod: ,,, | Performed by: NURSE PRACTITIONER

## 2023-09-13 PROCEDURE — 3078F PR MOST RECENT DIASTOLIC BLOOD PRESSURE < 80 MM HG: ICD-10-PCS | Mod: ,,, | Performed by: NURSE PRACTITIONER

## 2023-09-13 PROCEDURE — 99214 OFFICE O/P EST MOD 30 MIN: CPT | Mod: ,,, | Performed by: NURSE PRACTITIONER

## 2023-09-13 PROCEDURE — 90686 IIV4 VACC NO PRSV 0.5 ML IM: CPT | Mod: ,,, | Performed by: NURSE PRACTITIONER

## 2023-09-13 PROCEDURE — 82947 GLUCOSE, FASTING: ICD-10-PCS | Mod: ,,, | Performed by: CLINICAL MEDICAL LABORATORY

## 2023-09-13 PROCEDURE — 90686 FLU VACCINE (QUAD) GREATER THAN OR EQUAL TO 3YO PRESERVATIVE FREE IM: ICD-10-PCS | Mod: ,,, | Performed by: NURSE PRACTITIONER

## 2023-09-13 PROCEDURE — 80061 LIPID PANEL: CPT | Mod: ,,, | Performed by: CLINICAL MEDICAL LABORATORY

## 2023-09-13 PROCEDURE — 90677 PR PNEUMOCOCCAL CONJ VACCINE, 20 VALENT, IM: ICD-10-PCS | Mod: ,,, | Performed by: NURSE PRACTITIONER

## 2023-09-13 PROCEDURE — 90677 PCV20 VACCINE IM: CPT | Mod: ,,, | Performed by: NURSE PRACTITIONER

## 2023-09-13 PROCEDURE — 3074F PR MOST RECENT SYSTOLIC BLOOD PRESSURE < 130 MM HG: ICD-10-PCS | Mod: ,,, | Performed by: NURSE PRACTITIONER

## 2023-09-13 PROCEDURE — G0008 ADMIN INFLUENZA VIRUS VAC: HCPCS | Mod: ,,, | Performed by: NURSE PRACTITIONER

## 2023-09-13 PROCEDURE — 3078F DIAST BP <80 MM HG: CPT | Mod: ,,, | Performed by: NURSE PRACTITIONER

## 2023-09-13 PROCEDURE — 3008F PR BODY MASS INDEX (BMI) DOCUMENTED: ICD-10-PCS | Mod: ,,, | Performed by: NURSE PRACTITIONER

## 2023-09-13 PROCEDURE — 82947 ASSAY GLUCOSE BLOOD QUANT: CPT | Mod: ,,, | Performed by: CLINICAL MEDICAL LABORATORY

## 2023-09-13 RX ORDER — BENZTROPINE MESYLATE 0.5 MG/1
0.5 TABLET ORAL DAILY
COMMUNITY
Start: 2023-08-30

## 2023-09-13 RX ORDER — DICLOFENAC SODIUM 75 MG/1
75 TABLET, DELAYED RELEASE ORAL 2 TIMES DAILY
COMMUNITY
Start: 2023-04-27 | End: 2023-10-31

## 2023-09-13 RX ORDER — MEGESTROL ACETATE 40 MG/1
40 TABLET ORAL DAILY
COMMUNITY
Start: 2023-08-14 | End: 2023-09-20

## 2023-09-13 RX ORDER — BUPROPION HYDROCHLORIDE 150 MG/1
150 TABLET ORAL DAILY
COMMUNITY
Start: 2023-05-15

## 2023-09-13 NOTE — PROGRESS NOTES
Makayla Veloz DNP, [place]    [address] [place] [address]     PATIENT NAME: Ratna Amin  : 1975  DATE: 23  MRN: [id number]      Billing Provider: Makayla Veloz DNP, [place]  Level of Service: [place] OFFICE/OUTPT VISIT, EST, LEVL IV, 30-39 MIN  Patient PCP Information       Provider PCP Type    Primary Doctor No General            Reason for Visit / Chief Complaint: Annual Exam (Patient states she is here for annual wellness check. She would like to discuss why she has been losing weight, and she would like help quitting smoking. She would like to discuss vaccines as well. )       Update PCP  Update Chief Complaint         History of Present Illness / Problem Focused Workflow     Ratna Amin presents to the clinic with Annual Exam (Patient states she is here for annual wellness check. She would like to discuss why she has been losing weight, and she would like help quitting smoking. She would like to discuss vaccines as well. )         Review of Systems     Review of Systems   Constitutional:  Positive for appetite change. Negative for activity change, chills, fatigue and fever.   HENT:  Negative for nasal congestion, ear pain, hearing loss, postnasal drip and sore throat.    Respiratory:  Negative for cough, chest tightness, shortness of breath and wheezing.    Cardiovascular:  Negative for chest pain, palpitations, leg swelling and claudication.   Gastrointestinal:  Negative for abdominal pain, change in bowel habit, constipation, diarrhea, nausea and vomiting.   Genitourinary:  Negative for dysuria.   Musculoskeletal:  Negative for arthralgias, back pain, gait problem and myalgias.   Integumentary:  Negative for rash.   Neurological:  Negative for weakness and headaches.   Psychiatric/Behavioral:  Negative for suicidal ideas. The patient is not nervous/anxious.         Medical / Social / Family History     Past Medical History:   Diagnosis Date    Bipolar  disorder        Past Surgical History:   Procedure Laterality Date    ADENOIDECTOMY  1982    CHOLECYSTECTOMY      FOOT SURGERY Bilateral 03/2023    HERNIA REPAIR  2001    HYSTERECTOMY      TONSILLECTOMY  1982    TUBAL LIGATION         Social History  Ms. Ratna Amin  reports that she has been smoking cigars. She started smoking about 7 months ago. She has been exposed to tobacco smoke. She has never used smokeless tobacco. She reports current drug use. Frequency: 7.00 times per week. Drug: Marijuana. She reports that she does not drink alcohol.    Family History  Ms. Ratna Amin's family history includes Cancer in her father and mother; Depression in her father; Diabetes in her mother; Hypertension in her father; Mental illness in her father.    Medications and Allergies     Medications  Outpatient Medications Marked as Taking for the 9/13/23 encounter (Office Visit) with Makayla Veloz, SY, FNP   Medication Sig Dispense Refill    ARIPiprazole (ABILIFY) 400 mg SSRR injection (single-use vial) Inject 400 mg into the muscle.      benztropine (COGENTIN) 0.5 MG tablet Take 0.5 mg by mouth once daily.      buPROPion (WELLBUTRIN XL) 150 MG TB24 tablet Take 150 mg by mouth once daily.      citalopram (CELEXA) 20 MG tablet Take 20 mg by mouth.      dextroamphetamine-amphetamine 30 mg Tab Take 30 mg by mouth.      diclofenac (VOLTAREN) 75 MG EC tablet Take 75 mg by mouth 2 (two) times daily.      doxycycline (VIBRA-TABS) 100 MG tablet Take by mouth.      etodolac (LODINE) 500 MG tablet Take by mouth.      folic acid (FOLVITE) 1 MG tablet Take 1 mg by mouth.      hydrOXYchloroQUINE (PLAQUENIL) 200 mg tablet Take 200 mg by mouth.      methotrexate (TREXALL) 10 MG tablet Take 10 mg by mouth.      prazosin (MINIPRESS) 2 MG Cap Take 2 mg by mouth.      [DISCONTINUED] megestroL (MEGACE) 40 MG Tab Take 40 mg by mouth once daily Take once daily for 7 days..       Current Facility-Administered Medications for the 9/13/23  "encounter (Office Visit) with Makayla Veloz DNP FNP   Medication Dose Route Frequency Provider Last Rate Last Admin    [COMPLETED] dexAMETHasone injection 8 mg  8 mg Intramuscular 1 time in Clinic/HOD Makayla Veloz DNP, FNP   8 mg at 09/20/23 0936    [COMPLETED] ketorolac injection 30 mg  30 mg Intramuscular 1 time in Clinic/HOD Makayla Veloz DNP, FNP   30 mg at 09/20/23 0937       Allergies  Review of patient's allergies indicates:   Allergen Reactions    Penicillins        Physical Examination     Vitals:    09/13/23 0837   BP: 117/79   BP Location: Right arm   Patient Position: Sitting   BP Method: Medium (Automatic)   Pulse: 91   Resp: 18   Temp: 98.4 °F (36.9 °C)   TempSrc: Oral   SpO2: 97%   Weight: 57.6 kg (127 lb)   Height: 5' 9" (1.753 m)     Physical Exam  Vitals and nursing note reviewed.   Constitutional:       General: She is not in acute distress.     Appearance: Normal appearance. She is not ill-appearing.   Eyes:      Extraocular Movements: Extraocular movements intact.      Pupils: Pupils are equal, round, and reactive to light.   Cardiovascular:      Rate and Rhythm: Normal rate and regular rhythm.      Heart sounds: Normal heart sounds.   Pulmonary:      Effort: Pulmonary effort is normal.      Breath sounds: Normal breath sounds.   Abdominal:      General: Bowel sounds are normal.      Palpations: Abdomen is soft.   Musculoskeletal:         General: Normal range of motion.   Skin:     Findings: No rash.   Neurological:      General: No focal deficit present.      Mental Status: She is alert and oriented to person, place, and time. Mental status is at baseline.   Psychiatric:         Mood and Affect: Mood normal.         Behavior: Behavior normal.          Assessment and Plan (including Health Maintenance)      Problem List  Smart Sets  Document Outside HM   :    Plan:         Health Maintenance Due   Topic Date Due    Mammogram  Never done    Colorectal Cancer Screening  Never done    " COVID-19 Vaccine (3 - Booster for [place] series) 10/24/2022       Problem List Items Addressed This Visit          Immunology/Multi System    Rheumatoid arthritis of multiple sites with negative rheumatoid factor (Chronic)     Other Visit Diagnoses       Encounter for general adult medical examination without abnormal findings    -  Primary    Encounter for screening mammogram for malignant neoplasm of breast        Relevant Orders    Mammo Digital Screening Bilat    Screening, lipid        Relevant Orders    Lipid Panel (Completed)    Screening for diabetes mellitus        Relevant Orders    Glucose, Fasting (Completed)    Unspecified protein-calorie malnutrition        Relevant Orders    Lipid Panel (Completed)    Vaccine for streptococcus pneumoniae and influenza        Relevant Medications    pneumoc 20-radhames conj-dip cr(PF) (PREVNAR-20 (PF)) injection Syrg 0.5 mL (Completed)    Screening for malignant neoplasm of colon        Relevant Orders    Ambulatory referral/consult to Gastroenterology          Encounter for general adult medical examination without abnormal findings    Encounter for screening mammogram for malignant neoplasm of breast  -     Mammo Digital Screening Bilat; Future; Expected date: 09/13/2023    Screening, lipid  -     Lipid Panel; Future; Expected date: 09/13/2023    Screening for diabetes mellitus  -     Glucose, Fasting; Future; Expected date: 09/13/2023    Unspecified protein-calorie malnutrition  -     Lipid Panel; Future; Expected date: 09/13/2023    Vaccine for streptococcus pneumoniae and influenza  -     pneumoc 20-radhames conj-dip cr(PF) (PREVNAR-20 (PF)) injection Syrg 0.5 mL    Screening for malignant neoplasm of colon  -     Ambulatory referral/consult to Gastroenterology; Future; Expected date: 09/13/2023    Rheumatoid arthritis of multiple sites with negative rheumatoid factor    Other orders  -     Influenza - Quadrivalent (PF)       Health Maintenance Topics with due status: Not  Due       Topic Last Completion Date    TETANUS VACCINE 10/13/2017    Lipid Panel 09/13/2023         Future Appointments   Date Time Provider Department Center   10/31/2023  3:30 PM Anne Green CNM [facility] JEANNIE Cooper        Follow up in about 1 year (around 9/13/2024).     Signature:  Makayla Veloz DNP, FNP  [address] [place] [address]  Phone #: [phone]  Fax #: [phone]    Date of encounter: 9/13/23    Patient Instructions   [place] Smoking Cessation Hot[place] given to patient.   Attend regularly scheduled office visits.  Schedule yearly mammogram.  Schedule colonoscopy as needed.  Take medications as prescribed.   Avoid adding table salt to foods.   Recommend regular physical activity 30 min most days of the week.

## 2023-09-13 NOTE — LETTER
AUTHORIZATION FOR RELEASE OF   CONFIDENTIAL INFORMATION    Dear [facility] Medical Records,    We are seeing Ratna Amin, date of birth 1975, in the clinic at No Department Specified. Makayla Veloz DNP, [place] is the patient's PCP. Ratna Amin has an outstanding lab/procedure at the time we reviewed her chart. In order to help keep her health information updated, she has authorized us to request the following medical record(s):        (  )  MAMMOGRAM                                      ( x )  COLONOSCOPY      (  )  PAP SMEAR                                          (  )  OUTSIDE LAB RESULTS     (  )  DEXA SCAN                                          (  )  EYE EXAM            (  )  FOOT EXAM                                          (  )  ENTIRE RECORD     (  )  OUTSIDE IMMUNIZATIONS                 (  )  _______________         Please fax records to [facility], Betsy Mann, DNP, FNP-C, [phone]       If you have any questions, please contact Bronwyn Aleman RN at [phone]  .           Patient Name: Ratna Amin  : 1975  Patient Phone #: [phone]

## 2023-09-13 NOTE — PATIENT INSTRUCTIONS
[place] Smoking Cessation Hot[place] given to patient.   Attend regularly scheduled office visits.  Schedule yearly mammogram.  Schedule colonoscopy as needed.  Take medications as prescribed.   Avoid adding table salt to foods.   Recommend regular physical activity 30 min most days of the week.

## 2023-09-20 ENCOUNTER — OFFICE VISIT (OUTPATIENT)
Dept: FAMILY MEDICINE | Facility: CLINIC | Age: 48
End: 2023-09-20
Payer: MEDICARE

## 2023-09-20 VITALS
WEIGHT: 125 LBS | BODY MASS INDEX: 18.51 KG/M2 | TEMPERATURE: 98 F | SYSTOLIC BLOOD PRESSURE: 114 MMHG | HEART RATE: 75 BPM | OXYGEN SATURATION: 97 % | DIASTOLIC BLOOD PRESSURE: 77 MMHG | HEIGHT: 69 IN | RESPIRATION RATE: 18 BRPM

## 2023-09-20 DIAGNOSIS — M25.551 RIGHT HIP PAIN: Primary | ICD-10-CM

## 2023-09-20 DIAGNOSIS — M05.751 RHEUMATOID ARTHRITIS INVOLVING RIGHT HIP WITH POSITIVE RHEUMATOID FACTOR: ICD-10-CM

## 2023-09-20 PROCEDURE — 99214 PR OFFICE/OUTPT VISIT, EST, LEVL IV, 30-39 MIN: ICD-10-PCS | Mod: 25,,, | Performed by: NURSE PRACTITIONER

## 2023-09-20 PROCEDURE — 3074F SYST BP LT 130 MM HG: CPT | Mod: ,,, | Performed by: NURSE PRACTITIONER

## 2023-09-20 PROCEDURE — 99214 OFFICE O/P EST MOD 30 MIN: CPT | Mod: 25,,, | Performed by: NURSE PRACTITIONER

## 2023-09-20 PROCEDURE — 3074F PR MOST RECENT SYSTOLIC BLOOD PRESSURE < 130 MM HG: ICD-10-PCS | Mod: ,,, | Performed by: NURSE PRACTITIONER

## 2023-09-20 PROCEDURE — 1159F PR MEDICATION LIST DOCUMENTED IN MEDICAL RECORD: ICD-10-PCS | Mod: ,,, | Performed by: NURSE PRACTITIONER

## 2023-09-20 PROCEDURE — 1160F PR REVIEW ALL MEDS BY PRESCRIBER/CLIN PHARMACIST DOCUMENTED: ICD-10-PCS | Mod: ,,, | Performed by: NURSE PRACTITIONER

## 2023-09-20 PROCEDURE — 96372 THER/PROPH/DIAG INJ SC/IM: CPT | Mod: ,,, | Performed by: NURSE PRACTITIONER

## 2023-09-20 PROCEDURE — 3008F BODY MASS INDEX DOCD: CPT | Mod: ,,, | Performed by: NURSE PRACTITIONER

## 2023-09-20 PROCEDURE — 1159F MED LIST DOCD IN RCRD: CPT | Mod: ,,, | Performed by: NURSE PRACTITIONER

## 2023-09-20 PROCEDURE — 3078F PR MOST RECENT DIASTOLIC BLOOD PRESSURE < 80 MM HG: ICD-10-PCS | Mod: ,,, | Performed by: NURSE PRACTITIONER

## 2023-09-20 PROCEDURE — 1160F RVW MEDS BY RX/DR IN RCRD: CPT | Mod: ,,, | Performed by: NURSE PRACTITIONER

## 2023-09-20 PROCEDURE — 3078F DIAST BP <80 MM HG: CPT | Mod: ,,, | Performed by: NURSE PRACTITIONER

## 2023-09-20 PROCEDURE — 3008F PR BODY MASS INDEX (BMI) DOCUMENTED: ICD-10-PCS | Mod: ,,, | Performed by: NURSE PRACTITIONER

## 2023-09-20 PROCEDURE — 96372 PR INJECTION,THERAP/PROPH/DIAG2ST, IM OR SUBCUT: ICD-10-PCS | Mod: ,,, | Performed by: NURSE PRACTITIONER

## 2023-09-20 RX ORDER — DEXAMETHASONE SODIUM PHOSPHATE 4 MG/ML
8 INJECTION, SOLUTION INTRA-ARTICULAR; INTRALESIONAL; INTRAMUSCULAR; INTRAVENOUS; SOFT TISSUE
Status: COMPLETED | OUTPATIENT
Start: 2023-09-20 | End: 2023-09-20

## 2023-09-20 RX ORDER — KETOROLAC TROMETHAMINE 30 MG/ML
30 INJECTION, SOLUTION INTRAMUSCULAR; INTRAVENOUS
Status: COMPLETED | OUTPATIENT
Start: 2023-09-20 | End: 2023-09-20

## 2023-09-20 RX ADMIN — KETOROLAC TROMETHAMINE 30 MG: 30 INJECTION, SOLUTION INTRAMUSCULAR; INTRAVENOUS at 09:09

## 2023-09-20 RX ADMIN — DEXAMETHASONE SODIUM PHOSPHATE 8 MG: 4 INJECTION, SOLUTION INTRA-ARTICULAR; INTRALESIONAL; INTRAMUSCULAR; INTRAVENOUS; SOFT TISSUE at 09:09

## 2023-09-20 NOTE — PROGRESS NOTES
"   Makayla Veloz DNP, [place]    [address] [place] [address]     PATIENT NAME: Ratna Amin  : 1975  DATE: 23  MRN: [id number]      Billing Provider: Makayla Veloz DNP, FNP  Level of Service:   Patient PCP Information       Provider PCP Type    Primary Doctor No General            Reason for Visit / Chief Complaint: Hip Pain (Pt says she has "unbearable" hip and knee pain for the past week and a half.  She says she has the pain all the time.  She denies injury.  She reports hx of RA and fibromyalgia but can't get in to see Amor Goldsmith until Oct. 26.  She says the pain is in the joints.) and Knee Pain       Update PCP  Update Chief Complaint         History of Present Illness / Problem Focused Workflow     Ratna Amin presents to the clinic with Hip Pain (Pt says she has "unbearable" hip and knee pain for the past week and a half.  She says she has the pain all the time.  She denies injury.  She reports hx of RA and fibromyalgia but can't get in to see Amor Goldsmith until Oct. 26.  She says the pain is in the joints.) and Knee Pain     Hip Pain     Knee Pain       Review of Systems     Review of Systems   Constitutional:  Negative for activity change, appetite change, chills, fatigue and fever.   HENT:  Negative for nasal congestion, ear pain, hearing loss, postnasal drip and sore throat.    Respiratory:  Negative for cough, chest tightness, shortness of breath and wheezing.    Cardiovascular:  Negative for chest pain, palpitations, leg swelling and claudication.   Gastrointestinal:  Negative for abdominal pain, change in bowel habit, constipation, diarrhea, nausea and vomiting.   Genitourinary:  Negative for dysuria.   Musculoskeletal:  Negative for arthralgias, back pain, gait problem and myalgias.   Integumentary:  Negative for rash.   Neurological:  Negative for weakness and headaches.   Psychiatric/Behavioral:  Negative for suicidal ideas. The " patient is not nervous/anxious.         Medical / Social / Family History     Past Medical History:   Diagnosis Date    Bipolar disorder        Past Surgical History:   Procedure Laterality Date    ADENOIDECTOMY  1982    CHOLECYSTECTOMY      FOOT SURGERY Bilateral 03/2023    HERNIA REPAIR  2001    HYSTERECTOMY      TONSILLECTOMY  1982    TUBAL LIGATION         Social History  Ms. Ratna Amin  reports that she has been smoking cigars. She started smoking about 7 months ago. She has been exposed to tobacco smoke. She has never used smokeless tobacco. She reports current drug use. Frequency: 7.00 times per week. Drug: Marijuana. She reports that she does not drink alcohol.    Family History  Ms. Ratna Amin's family history includes Cancer in her father and mother; Depression in her father; Diabetes in her mother; Hypertension in her father; Mental illness in her father.    Medications and Allergies     Medications  Outpatient Medications Marked as Taking for the 9/20/23 encounter (Office Visit) with Makayla Veloz DNP, FNP   Medication Sig Dispense Refill    ARIPiprazole (ABILIFY) 400 mg SSRR injection (single-use vial) Inject 400 mg into the muscle.      benztropine (COGENTIN) 0.5 MG tablet Take 0.5 mg by mouth once daily.      buPROPion (WELLBUTRIN XL) 150 MG TB24 tablet Take 150 mg by mouth once daily.      citalopram (CELEXA) 20 MG tablet Take 20 mg by mouth.      dextroamphetamine-amphetamine 30 mg Tab Take 30 mg by mouth.      diclofenac (VOLTAREN) 75 MG EC tablet Take 75 mg by mouth 2 (two) times daily.      doxycycline (VIBRA-TABS) 100 MG tablet Take by mouth.      etodolac (LODINE) 500 MG tablet Take by mouth.      folic acid (FOLVITE) 1 MG tablet Take 1 mg by mouth.      hydrOXYchloroQUINE (PLAQUENIL) 200 mg tablet Take 200 mg by mouth.      methotrexate (TREXALL) 10 MG tablet Take 10 mg by mouth.      prazosin (MINIPRESS) 2 MG Cap Take 2 mg by mouth.       Current Facility-Administered Medications  "for the 9/20/23 encounter (Office Visit) with Makayla Veloz DNP FNBRYAN   Medication Dose Route Frequency Provider Last Rate Last Admin    [COMPLETED] dexAMETHasone injection 8 mg  8 mg Intramuscular 1 time in Clinic/HOD Makayla Veloz DNP, FNP   8 mg at 09/20/23 0936    [COMPLETED] ketorolac injection 30 mg  30 mg Intramuscular 1 time in Clinic/HOD Makayla Veloz DNP, FNP   30 mg at 09/20/23 0937       Allergies  Review of patient's allergies indicates:   Allergen Reactions    Penicillins        Physical Examination     Vitals:    09/20/23 0820   BP: 114/77   BP Location: Left arm   Patient Position: Sitting   BP Method: Medium (Automatic)   Pulse: 75   Resp: 18   Temp: 97.8 °F (36.6 °C)   TempSrc: Oral   SpO2: 97%   Weight: 56.7 kg (125 lb)   Height: 5' 9" (1.753 m)     Physical Exam  Vitals and nursing note reviewed.   Constitutional:       General: She is not in acute distress.  HENT:      Nose: Nose normal.      Mouth/Throat:      Mouth: Mucous membranes are moist.   Eyes:      Pupils: Pupils are equal, round, and reactive to light.   Cardiovascular:      Rate and Rhythm: Normal rate and regular rhythm.      Pulses: Normal pulses.      Heart sounds: Normal heart sounds. No murmur heard.  Pulmonary:      Effort: Pulmonary effort is normal. No respiratory distress.      Breath sounds: Normal breath sounds. No wheezing, rhonchi or rales.   Chest:      Chest wall: No tenderness.   Abdominal:      General: Bowel sounds are normal.      Palpations: Abdomen is soft.   Musculoskeletal:         General: Normal range of motion.      Cervical back: Normal range of motion and neck supple.      Right hip: Tenderness present.      Right lower leg: No edema.      Left lower leg: No edema.        Legs:    Skin:     General: Skin is warm and dry.   Neurological:      General: No focal deficit present.      Mental Status: She is alert and oriented to person, place, and time.          Assessment and Plan (including Health " Maintenance)      Problem List  Smart Sets  Document Outside HM   :    Plan:         Health Maintenance Due   Topic Date Due    Mammogram  Never done    Colorectal Cancer Screening  Never done    COVID-19 Vaccine (3 - Booster for [place] series) 10/24/2022       Problem List Items Addressed This Visit    None  Visit Diagnoses       Right hip pain    -  Primary    Relevant Medications    ketorolac injection 30 mg (Completed)    dexAMETHasone injection 8 mg (Completed)    Rheumatoid arthritis involving right hip with positive rheumatoid factor        Relevant Medications    ketorolac injection 30 mg (Completed)    dexAMETHasone injection 8 mg (Completed)          Right hip pain  -     ketorolac injection 30 mg  -     dexAMETHasone injection 8 mg    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     Signature:  Makayla Veloz DNP, FNP  [address] [place] [address]  Phone #: [phone]  Fax #: [phone]    Date of encounter: 9/20/23    Patient Instructions   Continue current meds and keep follow up with rheumatology.

## 2023-09-20 NOTE — LETTER
September 20, 2023      [facility]  [address] [place] [address]  Phone: [phone]  Fax: [phone]       Patient: Ratna Amin   YOB: 1975  Date of Visit: 09/20/2023    To Whom It May Concern:    Ethan Amin  was at [facility] on 09/20/2023. The patient may return to work/school on 9/21/23 with no restrictions. If you have any questions or concerns, or if I can be of further assistance, please do not hesitate to contact me.    Sincerely,    BECKIE Olivera DNP, FNP-C

## 2023-09-25 ENCOUNTER — OUTSIDE PLACE OF SERVICE (OUTPATIENT)
Dept: CARDIOLOGY | Facility: HOSPITAL | Age: 48
End: 2023-09-25
Payer: MEDICARE

## 2023-09-25 PROCEDURE — 93010 PR ELECTROCARDIOGRAM REPORT: ICD-10-PCS | Mod: ,,, | Performed by: INTERNAL MEDICINE

## 2023-09-25 PROCEDURE — 93010 ELECTROCARDIOGRAM REPORT: CPT | Mod: ,,, | Performed by: INTERNAL MEDICINE

## 2023-10-20 DIAGNOSIS — R92.8 ABNORMALITY OF RIGHT BREAST ON SCREENING MAMMOGRAM: Primary | ICD-10-CM

## 2023-10-20 LAB — BCS RECOMMENDATION EXT: NORMAL

## 2023-10-24 LAB — BCS RECOMMENDATION EXT: NORMAL

## 2023-10-31 ENCOUNTER — OFFICE VISIT (OUTPATIENT)
Dept: OBSTETRICS AND GYNECOLOGY | Facility: CLINIC | Age: 48
End: 2023-10-31
Payer: COMMERCIAL

## 2023-10-31 VITALS
SYSTOLIC BLOOD PRESSURE: 110 MMHG | DIASTOLIC BLOOD PRESSURE: 72 MMHG | OXYGEN SATURATION: 98 % | RESPIRATION RATE: 16 BRPM | BODY MASS INDEX: 19.82 KG/M2 | WEIGHT: 133.81 LBS | HEART RATE: 85 BPM | TEMPERATURE: 98 F | HEIGHT: 69 IN

## 2023-10-31 DIAGNOSIS — Z11.3 ENCOUNTER FOR SPECIAL SCREENING EXAMINATION FOR INFECTION WITH PREDOMINANTLY SEXUAL MODE OF TRANSMISSION: ICD-10-CM

## 2023-10-31 DIAGNOSIS — Z72.51 HIGH RISK HETEROSEXUAL BEHAVIOR: ICD-10-CM

## 2023-10-31 DIAGNOSIS — Z53.8 PAP SMEAR OF CERVIX NOT NEEDED: ICD-10-CM

## 2023-10-31 DIAGNOSIS — Z01.419 WOMEN'S ANNUAL ROUTINE GYNECOLOGICAL EXAMINATION: Primary | ICD-10-CM

## 2023-10-31 PROCEDURE — 1159F MED LIST DOCD IN RCRD: CPT | Mod: ,,, | Performed by: ADVANCED PRACTICE MIDWIFE

## 2023-10-31 PROCEDURE — 87591 N.GONORRHOEAE DNA AMP PROB: CPT | Mod: ,,, | Performed by: CLINICAL MEDICAL LABORATORY

## 2023-10-31 PROCEDURE — 87491 CHLMYD TRACH DNA AMP PROBE: CPT | Mod: ,,, | Performed by: CLINICAL MEDICAL LABORATORY

## 2023-10-31 PROCEDURE — 3008F PR BODY MASS INDEX (BMI) DOCUMENTED: ICD-10-PCS | Mod: ,,, | Performed by: ADVANCED PRACTICE MIDWIFE

## 2023-10-31 PROCEDURE — 1159F PR MEDICATION LIST DOCUMENTED IN MEDICAL RECORD: ICD-10-PCS | Mod: ,,, | Performed by: ADVANCED PRACTICE MIDWIFE

## 2023-10-31 PROCEDURE — 3074F SYST BP LT 130 MM HG: CPT | Mod: ,,, | Performed by: ADVANCED PRACTICE MIDWIFE

## 2023-10-31 PROCEDURE — 3008F BODY MASS INDEX DOCD: CPT | Mod: ,,, | Performed by: ADVANCED PRACTICE MIDWIFE

## 2023-10-31 PROCEDURE — 3074F PR MOST RECENT SYSTOLIC BLOOD PRESSURE < 130 MM HG: ICD-10-PCS | Mod: ,,, | Performed by: ADVANCED PRACTICE MIDWIFE

## 2023-10-31 PROCEDURE — 87491 CHLAMYDIA/GONORRHOEAE(GC), PCR: ICD-10-PCS | Mod: ,,, | Performed by: CLINICAL MEDICAL LABORATORY

## 2023-10-31 PROCEDURE — G0101 PR CA SCREEN;PELVIC/BREAST EXAM: ICD-10-PCS | Mod: GZ,,, | Performed by: ADVANCED PRACTICE MIDWIFE

## 2023-10-31 PROCEDURE — 87591 CHLAMYDIA/GONORRHOEAE(GC), PCR: ICD-10-PCS | Mod: ,,, | Performed by: CLINICAL MEDICAL LABORATORY

## 2023-10-31 PROCEDURE — G0101 CA SCREEN;PELVIC/BREAST EXAM: HCPCS | Mod: GZ,,, | Performed by: ADVANCED PRACTICE MIDWIFE

## 2023-10-31 PROCEDURE — 3078F PR MOST RECENT DIASTOLIC BLOOD PRESSURE < 80 MM HG: ICD-10-PCS | Mod: ,,, | Performed by: ADVANCED PRACTICE MIDWIFE

## 2023-10-31 PROCEDURE — 3078F DIAST BP <80 MM HG: CPT | Mod: ,,, | Performed by: ADVANCED PRACTICE MIDWIFE

## 2023-11-01 LAB
CHLAMYDIA BY PCR: POSITIVE
N. GONORRHOEAE (GC) BY PCR: NEGATIVE

## 2023-11-01 NOTE — PROGRESS NOTES
Patient ID: Ratna Amin is a 48 y.o. female     Chief Complaint:   Chief Complaint   Patient presents with    Annual Exam     Patient is here for her annual exam and pap smear. No problems or concerns.        HPI: Ratna Amin is a 48 y.o. female who presents for well woman exam.  No issues, problems, or  complaints.  Had total hysterectomy 7 years ago d/t DUB. Surgical pathology of uterus, cervix, and fallopian tubes was benign. Last Pap NILM completed 11/2015 before hysterectomy.  Explained ASCCP guidelines and no Pap needed d/t hysterectomy for non-cancerous reason.   LMP: No LMP recorded. Patient has had a hysterectomy.  Sexually active: yes, agrees to recommended GC/CT screening   Contraceptive: n/a  Mammogram: 10/24/23 negative    Past Medical History:   Diagnosis Date    Bipolar disorder     Fibromyalgia     Polyarthritis        Past Surgical History:   Procedure Laterality Date    ADENOIDECTOMY  1982    CHOLECYSTECTOMY      FOOT SURGERY Bilateral 03/2023    HERNIA REPAIR  2001    PARTIAL HYSTERECTOMY      TONSILLECTOMY  1982    TUBAL LIGATION         Social History     Socioeconomic History    Marital status:    Tobacco Use    Smoking status: Every Day     Types: Cigars     Start date: 2/1/2023     Passive exposure: Current    Smokeless tobacco: Never    Tobacco comments:     She [place] on the [place] quit program. She just got a counselor and they are supposed to sending her patches and gum   Substance and Sexual Activity    Alcohol use: Never    Drug use: Yes     Frequency: 7.0 times per week     Types: Marijuana    Sexual activity: Yes     Partners: Female     Birth control/protection: Other-see comments     Comment: Hysterectomy       Family History   Problem Relation Age of Onset    Diabetes Mother     Cancer Mother     Cancer Father     Mental illness Father     Hypertension Father     Depression Father          /72 (BP Location: Left arm, Patient Position: Sitting)   Pulse 85   Temp  "97.9 °F (36.6 °C) (Oral)   Resp 16   Ht 5' 9" (1.753 m)   Wt 60.7 kg (133 lb 12.8 oz)   SpO2 98%   BMI 19.76 kg/m²     Wt Readings from Last 3 Encounters:   10/31/23 60.7 kg (133 lb 12.8 oz)   09/20/23 56.7 kg (125 lb)   09/13/23 57.6 kg (127 lb)        ROS:  Review of Systems   Constitutional: Negative.    Eyes: Negative.    Respiratory: Negative.     Cardiovascular: Negative.    Gastrointestinal: Negative.    Endocrine: Negative.    Genitourinary: Negative.    Musculoskeletal: Negative.    Integumentary:  Negative.   Neurological: Negative.    Hematological: Negative.    Psychiatric/Behavioral: Negative.     Breast: negative.         PHYSICAL EXAM:  Physical Exam  Constitutional:       Appearance: Normal appearance. She is normal weight.   HENT:      Head: Normocephalic.      Nose: Nose normal.   Eyes:      Extraocular Movements: Extraocular movements intact.   Cardiovascular:      Rate and Rhythm: Normal rate and regular rhythm.      Pulses: Normal pulses.      Heart sounds: Normal heart sounds.   Pulmonary:      Effort: Pulmonary effort is normal.      Breath sounds: Normal breath sounds.   Chest:   Breasts:     Right: Normal.      Left: Normal.   Abdominal:      Palpations: Abdomen is soft.      Hernia: There is no hernia in the left inguinal area or right inguinal area.   Genitourinary:     General: Normal vulva.      Exam position: Lithotomy position.      Pubic Area: No rash.       Labia:         Right: No rash.         Left: No rash.       Urethra: No prolapse.      Vagina: Normal.      Uterus: Absent.       Adnexa:         Right: No mass or tenderness.          Left: No mass or tenderness.        Comments: Uterus with cervix surgically absent  Musculoskeletal:         General: Normal range of motion.      Cervical back: Normal range of motion and neck supple.   Skin:     General: Skin is warm and dry.   Neurological:      Mental Status: She is alert and oriented to person, place, and time. "   Psychiatric:         Mood and Affect: Mood normal.         Behavior: Behavior normal.         Thought Content: Thought content normal.         Judgment: Judgment normal.          Assessment:  Women's annual routine gynecological examination    Encounter for special screening examination for infection with predominantly sexual mode of transmission  -     Chlamydia/GC, PCR; Future; Expected date: 10/31/2023    High risk heterosexual behavior  -     Chlamydia/GC, PCR; Future; Expected date: 10/31/2023    Pap smear of cervix not needed  -     Chlamydia/GC, PCR; Future; Expected date: 10/31/2023    Body mass index (BMI) of 19.0 to 19.9 in adult          ICD-10-CM ICD-9-CM    1. Women's annual routine gynecological examination  Z01.419 V72.31       2. Encounter for special screening examination for infection with predominantly sexual mode of transmission  Z11.3 V74.5 Chlamydia/GC, PCR      Chlamydia/GC, PCR      3. High risk heterosexual behavior  Z72.51 V69.2 Chlamydia/GC, PCR      Chlamydia/GC, PCR      4. Pap smear of cervix not needed  Z53.8 V64.3 Chlamydia/GC, PCR      Chlamydia/GC, PCR      5. Body mass index (BMI) of 19.0 to 19.9 in adult  Z68.1 V85.1           Plan:  Annual exam completed, no Pap needed, swab collected for GC/CT testing  Will call or send My Chart message after result reviewed  Patient was counseled today on ASCCP Pap guidelines and recommendations for yearly pelvic exams   Encouraged healthy dietary choices, increasing water intake, and exercising at least 3 x weekly    Follow up in about 1 year (around 10/31/2024) for annual gyn exam.

## 2023-11-02 DIAGNOSIS — A74.9 CHLAMYDIA INFECTION: Primary | ICD-10-CM

## 2023-11-02 RX ORDER — FLUCONAZOLE 150 MG/1
150 TABLET ORAL
Qty: 2 TABLET | Refills: 0 | Status: SHIPPED | OUTPATIENT
Start: 2023-11-02 | End: 2023-11-10

## 2023-11-02 RX ORDER — DOXYCYCLINE 100 MG/1
100 CAPSULE ORAL 2 TIMES DAILY
Qty: 14 CAPSULE | Refills: 0 | Status: SHIPPED | OUTPATIENT
Start: 2023-11-02 | End: 2023-11-09

## 2023-11-06 ENCOUNTER — TELEPHONE (OUTPATIENT)
Dept: OBSTETRICS AND GYNECOLOGY | Facility: CLINIC | Age: 48
End: 2023-11-06
Payer: COMMERCIAL

## 2023-11-06 NOTE — TELEPHONE ENCOUNTER
----- Message from Anne Green CNM sent at 11/6/2023  8:38 AM CST -----  Find out if she saw her My Chart message and obtained rx.

## 2024-01-10 ENCOUNTER — ANESTHESIA (OUTPATIENT)
Dept: PAIN MEDICINE | Facility: HOSPITAL | Age: 49
End: 2024-01-10
Payer: COMMERCIAL

## 2024-01-10 ENCOUNTER — ANESTHESIA EVENT (OUTPATIENT)
Dept: PAIN MEDICINE | Facility: HOSPITAL | Age: 49
End: 2024-01-10
Payer: COMMERCIAL

## 2024-01-10 ENCOUNTER — HOSPITAL ENCOUNTER (OUTPATIENT)
Facility: HOSPITAL | Age: 49
Discharge: HOME | End: 2024-01-10
Attending: ANESTHESIOLOGY | Admitting: ANESTHESIOLOGY
Payer: COMMERCIAL

## 2024-01-10 VITALS
SYSTOLIC BLOOD PRESSURE: 101 MMHG | HEIGHT: 69 IN | OXYGEN SATURATION: 100 % | DIASTOLIC BLOOD PRESSURE: 65 MMHG | TEMPERATURE: 98 F | HEART RATE: 67 BPM | RESPIRATION RATE: 20 BRPM | BODY MASS INDEX: 19.55 KG/M2 | WEIGHT: 132 LBS

## 2024-01-10 DIAGNOSIS — M70.60 TROCHANTERIC BURSITIS: ICD-10-CM

## 2024-01-10 PROCEDURE — 63600175 PHARM REV CODE 636 W HCPCS: Mod: JZ,JG | Performed by: ANESTHESIOLOGY

## 2024-01-10 PROCEDURE — D9220A PRA ANESTHESIA: Mod: 23,,, | Performed by: NURSE ANESTHETIST, CERTIFIED REGISTERED

## 2024-01-10 PROCEDURE — 63600175 PHARM REV CODE 636 W HCPCS: Performed by: NURSE ANESTHETIST, CERTIFIED REGISTERED

## 2024-01-10 PROCEDURE — 20610 DRAIN/INJ JOINT/BURSA W/O US: CPT | Mod: 50 | Performed by: ANESTHESIOLOGY

## 2024-01-10 PROCEDURE — 37000008 HC ANESTHESIA 1ST 15 MINUTES: Performed by: ANESTHESIOLOGY

## 2024-01-10 PROCEDURE — 25000003 PHARM REV CODE 250: Performed by: NURSE ANESTHETIST, CERTIFIED REGISTERED

## 2024-01-10 RX ORDER — TRIAMCINOLONE ACETONIDE 40 MG/ML
INJECTION, SUSPENSION INTRA-ARTICULAR; INTRAMUSCULAR CODE/TRAUMA/SEDATION MEDICATION
Status: DISCONTINUED | OUTPATIENT
Start: 2024-01-10 | End: 2024-01-10 | Stop reason: HOSPADM

## 2024-01-10 RX ORDER — PROPOFOL 10 MG/ML
VIAL (ML) INTRAVENOUS
Status: DISCONTINUED | OUTPATIENT
Start: 2024-01-10 | End: 2024-01-10

## 2024-01-10 RX ORDER — LIDOCAINE HYDROCHLORIDE 20 MG/ML
INJECTION, SOLUTION EPIDURAL; INFILTRATION; INTRACAUDAL; PERINEURAL
Status: DISCONTINUED | OUTPATIENT
Start: 2024-01-10 | End: 2024-01-10

## 2024-01-10 RX ORDER — BUPIVACAINE HYDROCHLORIDE 2.5 MG/ML
INJECTION, SOLUTION INFILTRATION; PERINEURAL CODE/TRAUMA/SEDATION MEDICATION
Status: DISCONTINUED | OUTPATIENT
Start: 2024-01-10 | End: 2024-01-10 | Stop reason: HOSPADM

## 2024-01-10 RX ORDER — SODIUM CHLORIDE 9 MG/ML
500 INJECTION, SOLUTION INTRAVENOUS CONTINUOUS
Status: DISCONTINUED | OUTPATIENT
Start: 2024-01-10 | End: 2024-01-10 | Stop reason: HOSPADM

## 2024-01-10 RX ADMIN — SODIUM CHLORIDE: 9 INJECTION, SOLUTION INTRAVENOUS at 08:01

## 2024-01-10 RX ADMIN — PROPOFOL 50 MG: 10 INJECTION, EMULSION INTRAVENOUS at 08:01

## 2024-01-10 RX ADMIN — LIDOCAINE HYDROCHLORIDE 50 MG: 20 INJECTION, SOLUTION INTRAVENOUS at 08:01

## 2024-01-10 NOTE — ANESTHESIA POSTPROCEDURE EVALUATION
Anesthesia Post Evaluation    Patient: Ratna Amin    Procedure(s) Performed: Procedure(s) (LRB):  INJECTION, JOINT, SHOULDER, HIP, OR KNEE (Bilateral)    Final Anesthesia Type: general      Patient location during evaluation: OPS  Patient participation: Yes- Able to Participate  Level of consciousness: awake and alert and oriented  Post-procedure vital signs: reviewed and stable  Pain management: adequate  Airway patency: patent    PONV status at discharge: No PONV  Anesthetic complications: no      Cardiovascular status: blood pressure returned to baseline and stable  Respiratory status: unassisted, spontaneous ventilation and room air  Hydration status: euvolemic  Follow-up not needed.  Comments: Refer to nursing note for pain/sophia score upon discharge from recovery.               Vitals Value Taken Time   BP 88/47 01/10/24 0852   Temp 36.6 01/10/24 0852   Pulse 80 01/10/24 0852   Resp 20 01/10/24 0852   SpO2 100 01/10/24 0852         No case tracking events are documented in the log.      Pain/Sophia Score: No data recorded

## 2024-01-10 NOTE — DISCHARGE SUMMARY
Patient underwent   Bilateral greater trochanteric bursa injection procedure 01/10/2024. The pt will follow up in clinic. Discharged home. Discharge Dx: Greater trochanteric bursitis

## 2024-01-10 NOTE — PLAN OF CARE
REFER TO WRITTEN DOCUMENT AND RECOVERY INFORMATION.    D/CD PATIENT VIAA WHEELCHAIR .    INFORMED PATIENT IF UNABLE TO VOID IN 8 HOURS, GO TO ER. NOTIFY MD OF REDNESS OR DRAINAGE FROM INJECTION SITE OR FEVER OVER 3-4 DAY. REST AND DRINK PLENTY OF FLUIDS FOR THE REMAINDER OF THE DAY. NO LIFTING OVER 5 LBS FOR THE REMAINDER OF THE DAY. CONTINUE REGULAR MEDICATIONS AS PRESCRIBED. MAY TAKE PAIN MEDICATION AS PRESCRIBED.     PAIN IMPROVED  100%  Preop pain 7.  Postop pain 0.

## 2024-01-10 NOTE — OP NOTE
01/10/2024  Ratna Amin 1975    Preoperative diagnosis: Greater trochanteric bursitis     Postoperative diagnosis: Greater trochanteric bursitis     Procedure: Bilateral greater trochanteric bursa injection    SURGEON: Dr Jabier Garza     Sedation: MAC     Complications: None     EBL: None     Patient was identified in the clinical exam room.  The appropriate site was marked and prepped and draped in usual sterile fashion with chloraprep. Time out was performed and  Anesthesia was initiated.  A skin wheal of 0.25% Bupivacaine(2.5mg/ml) 1 ml was raised over the target area of the greater trochanteric bursa right side.  A 25-gauge 31/2 inch needle was advanced down to osseous contact at the location of the greater trochanteric bursa.  A single injection of 3 cc Bupivacaine0.25% (2.5mg/ml)  with  [id number]/ml 1/2 ml was injected without complication.  The procedure was repeated on the left as described above. The patient tolerated procedure well with no adverse events.  She is monitored for the appropriate time of convalescence and discharged home in stable condition.    pre op score- 7/10  post op score- /10

## 2024-01-10 NOTE — ANESTHESIA PREPROCEDURE EVALUATION
01/10/2024  Ratna Amin is a 48 y.o., female.      Pre-op Assessment    I have reviewed the Patient Summary Reports.     I have reviewed the Nursing Notes. I have reviewed the NPO Status.   I have reviewed the Medications.     Review of Systems  Anesthesia Hx:  No problems with previous Anesthesia             Denies Family Hx of Anesthesia complications.    Denies Personal Hx of Anesthesia complications.                    Social:  Smoker       Hematology/Oncology:  Hematology Normal   Oncology Normal                                   EENT/Dental:  EENT/Dental Normal           Cardiovascular:                   ECG has been reviewed.                          Pulmonary:  Pulmonary Normal                       Renal/:  Renal/ Normal                 Musculoskeletal:  Arthritis (RA)               Neurological:    Neuromuscular Disease,   Denies Headaches.           Chronic Pain Syndrome                         Dermatological:  Skin Normal    Psych:  Psychiatric History (bipolar disorder)                Physical Exam  General: Well nourished    Airway:  Mallampati: II   Mouth Opening: Normal  TM Distance: Normal  Tongue: Normal  Neck ROM: Normal ROM    Dental:  Intact      Anesthesia Plan  Type of Anesthesia, risks & benefits discussed:    Anesthesia Type: Gen Natural Airway  Intra-op Monitoring Plan: Standard ASA Monitors  Post Op Pain Control Plan: multimodal analgesia  Induction:  IV  Informed Consent: Informed consent signed with the Patient and all parties understand the risks and agree with anesthesia plan.  All questions answered. Patient consented to blood products? Yes  ASA Score: 3  Day of Surgery Review of History & Physical: H&P Update referred to the surgeon/provider.I have interviewed and examined the patient. I have reviewed the patient's H&P dated: There are no significant changes.     Ready  For Surgery From Anesthesia Perspective.   .

## 2024-01-10 NOTE — TRANSFER OF CARE
"Anesthesia Transfer of Care Note    Patient: Ratna Amin    Procedure(s) Performed: Procedure(s) (LRB):  INJECTION, JOINT, SHOULDER, HIP, OR KNEE (Bilateral)    Patient location: Other:    Anesthesia Type: general    Transport from OR: Transported from OR on room air with adequate spontaneous ventilation    Post pain: adequate analgesia    Post assessment: no apparent anesthetic complications    Post vital signs: stable    Level of consciousness: responds to stimulation, awake and sedated    Nausea/Vomiting: no nausea/vomiting    Complications: none    Transfer of care protocol was followed    Last vitals: Visit Vitals  BP (!) 88/47 (BP Location: Right arm, Patient Position: Lying)   Pulse 81   Temp 36.6 °C (97.9 °F) (Oral)   Resp 20   Ht 5' 9" (1.753 m)   Wt 59.9 kg (132 lb)   SpO2 100%   BMI 19.49 kg/m²     "

## 2024-04-19 ENCOUNTER — OFFICE VISIT (OUTPATIENT)
Dept: FAMILY MEDICINE | Facility: CLINIC | Age: 49
End: 2024-04-19
Payer: MEDICARE

## 2024-04-19 VITALS
RESPIRATION RATE: 14 BRPM | BODY MASS INDEX: 19.4 KG/M2 | SYSTOLIC BLOOD PRESSURE: 110 MMHG | WEIGHT: 131 LBS | TEMPERATURE: 98 F | HEIGHT: 69 IN | DIASTOLIC BLOOD PRESSURE: 80 MMHG | HEART RATE: 80 BPM | OXYGEN SATURATION: 98 %

## 2024-04-19 DIAGNOSIS — Z12.11 SCREENING FOR COLON CANCER: ICD-10-CM

## 2024-04-19 DIAGNOSIS — Z87.891 HISTORY OF TOBACCO USE: ICD-10-CM

## 2024-04-19 DIAGNOSIS — M06.09 RHEUMATOID ARTHRITIS OF MULTIPLE SITES WITH NEGATIVE RHEUMATOID FACTOR: ICD-10-CM

## 2024-04-19 DIAGNOSIS — F31.0 BIPOLAR DISORDER, MOST RECENT EPISODE HYPOMANIC: ICD-10-CM

## 2024-04-19 DIAGNOSIS — Z00.00 ENCOUNTER FOR PREVENTIVE HEALTH EXAMINATION: ICD-10-CM

## 2024-04-19 DIAGNOSIS — R63.4 WEIGHT LOSS, ABNORMAL: ICD-10-CM

## 2024-04-19 DIAGNOSIS — Z00.00 ENCOUNTER FOR SUBSEQUENT ANNUAL WELLNESS VISIT (AWV) IN MEDICARE PATIENT: Primary | ICD-10-CM

## 2024-04-19 PROCEDURE — 3079F DIAST BP 80-89 MM HG: CPT | Mod: ,,, | Performed by: NURSE PRACTITIONER

## 2024-04-19 PROCEDURE — 1159F MED LIST DOCD IN RCRD: CPT | Mod: ,,, | Performed by: NURSE PRACTITIONER

## 2024-04-19 PROCEDURE — 1160F RVW MEDS BY RX/DR IN RCRD: CPT | Mod: ,,, | Performed by: NURSE PRACTITIONER

## 2024-04-19 PROCEDURE — 3074F SYST BP LT 130 MM HG: CPT | Mod: ,,, | Performed by: NURSE PRACTITIONER

## 2024-04-19 PROCEDURE — G0439 PPPS, SUBSEQ VISIT: HCPCS | Mod: ,,, | Performed by: NURSE PRACTITIONER

## 2024-04-19 NOTE — PATIENT INSTRUCTIONS
Counseling and Referral of Other Preventative  (Italic type indicates deductible and co-insurance are waived)    Patient Name: Ratna Amin  Today's Date: 4/19/2024    Health Maintenance       Date Due Completion Date    Colorectal Cancer Screening Never done ---    COVID-19 Vaccine (3 - 2023-24 season) 09/01/2023 8/29/2022    Mammogram 10/24/2024 10/24/2023    TETANUS VACCINE 10/13/2027 10/13/2017    Lipid Panel 09/13/2028 9/13/2023        No orders of the defined types were placed in this encounter.    The following information is provided to all patients.  This information is to help you find resources for any of the problems found today that may be affecting your health:                  Living healthy guide: [place]gov    Understanding Diabetes: www.diabetes.org      Eating healthy: www.cdc.gov/healthyweight      [facility] home safety checklist: www.cdc.gov/steadi/patient.html      Agency on Aging: [place].gov    Alcoholics anonymous (AA): www.aa.org      Physical Activity: www.lucy.nih.gov/rs5radh      Tobacco use: [place].gov

## 2024-04-19 NOTE — PROGRESS NOTES
"  Ratna Amin presented for a  Medicare AWV and comprehensive Health Risk Assessment today. The following components were reviewed and updated:    Medical history  Family History  Social history  Allergies and Current Medications  Health Risk Assessment  Health Maintenance  Care Team         ** See Completed Assessments for Annual Wellness Visit within the encounter summary.**         The following assessments were completed:  Living Situation  CAGE  Depression Screening  Timed Get Up and Go  Whisper Test  Cognitive Function Screening  Nutrition Screening  ADL Screening  PAQ Screening        Opioid Documentation    does not have a current opioid prescription.       Vitals:    04/19/24 1506   BP: 110/80   Pulse: 80   Resp: 14   Temp: 98.4 °F (36.9 °C)   SpO2: 98%   Weight: 59.4 kg (131 lb)   Height: 5' 9" (1.753 m)     Body mass index is 19.35 kg/m².  Physical Exam  Vitals and nursing note reviewed.   Constitutional:       General: She is not in acute distress.  HENT:      Nose: Nose normal.      Mouth/Throat:      Mouth: Mucous membranes are moist.   Eyes:      Pupils: Pupils are equal, round, and reactive to light.   Cardiovascular:      Rate and Rhythm: Normal rate and regular rhythm.      Pulses: Normal pulses.      Heart sounds: Normal heart sounds. No murmur heard.  Pulmonary:      Effort: Pulmonary effort is normal. No respiratory distress.      Breath sounds: Normal breath sounds. No wheezing, rhonchi or rales.   Chest:      Chest wall: No tenderness.   Abdominal:      General: Bowel sounds are normal.      Palpations: Abdomen is soft.   Musculoskeletal:         General: Normal range of motion.      Cervical back: Normal range of motion and neck supple.      Right lower leg: No edema.      Left lower leg: No edema.   Skin:     General: Skin is warm and dry.   Neurological:      General: No focal deficit present.      Mental Status: She is alert and oriented to person, place, and time.           Diagnoses " and health risks identified today and associated recommendations/orders:    Problem List Items Addressed This Visit          Psychiatric    Bipolar disorder, most recent episode hypomanic (Chronic)       Immunology/Multi System    Rheumatoid arthritis of multiple sites with negative rheumatoid factor (Chronic)     Other Visit Diagnoses       Encounter for subsequent annual wellness visit (AWV) in Medicare patient    -  Primary    History of tobacco use                Provided Ratna with a 5-10 year written screening schedule and personal prevention plan. Recommendations were developed using the USPSTF age appropriate recommendations. Education, counseling, and referrals were provided as needed. After Visit Summary printed and given to patient which includes a list of additional screenings\tests needed.    No follow-ups on file.    BEST MORRISON, RN     I offered to discuss advanced care planning, including how to pick a person who would make decisions for you if you were unable to make them for yourself, called a health care power of , and what kind of decisions you might make such as use of life sustaining treatments such as ventilators and tube feeding when faced with a life limiting illness recorded on a living will that they will need to know. (How you want to be cared for as you near the end of your natural life)     X Patient is interested in learning more about how to make advanced directives.  I provided them paperwork and offered to discuss this with them.

## 2024-04-20 ENCOUNTER — PATIENT MESSAGE (OUTPATIENT)
Dept: ADMINISTRATIVE | Facility: HOSPITAL | Age: 49
End: 2024-04-20

## 2024-04-21 DIAGNOSIS — Z12.11 SCREENING FOR COLON CANCER: ICD-10-CM

## 2024-05-09 DIAGNOSIS — Z71.89 COMPLEX CARE COORDINATION: ICD-10-CM

## 2024-06-04 ENCOUNTER — OFFICE VISIT (OUTPATIENT)
Dept: FAMILY MEDICINE | Facility: CLINIC | Age: 49
End: 2024-06-04
Payer: MEDICARE

## 2024-06-04 VITALS
HEART RATE: 79 BPM | WEIGHT: 126.81 LBS | TEMPERATURE: 99 F | RESPIRATION RATE: 18 BRPM | DIASTOLIC BLOOD PRESSURE: 79 MMHG | SYSTOLIC BLOOD PRESSURE: 115 MMHG | OXYGEN SATURATION: 98 % | HEIGHT: 69 IN | BODY MASS INDEX: 18.78 KG/M2

## 2024-06-04 DIAGNOSIS — F17.200 TOBACCO DEPENDENCE SYNDROME: Primary | ICD-10-CM

## 2024-06-04 DIAGNOSIS — R63.6 MILDLY UNDERWEIGHT ADULT: ICD-10-CM

## 2024-06-04 PROCEDURE — 3078F DIAST BP <80 MM HG: CPT | Mod: ,,, | Performed by: NURSE PRACTITIONER

## 2024-06-04 PROCEDURE — 3074F SYST BP LT 130 MM HG: CPT | Mod: ,,, | Performed by: NURSE PRACTITIONER

## 2024-06-04 PROCEDURE — 99214 OFFICE O/P EST MOD 30 MIN: CPT | Mod: ,,, | Performed by: NURSE PRACTITIONER

## 2024-06-04 PROCEDURE — 1159F MED LIST DOCD IN RCRD: CPT | Mod: ,,, | Performed by: NURSE PRACTITIONER

## 2024-06-04 PROCEDURE — 3008F BODY MASS INDEX DOCD: CPT | Mod: ,,, | Performed by: NURSE PRACTITIONER

## 2024-06-04 PROCEDURE — 1160F RVW MEDS BY RX/DR IN RCRD: CPT | Mod: ,,, | Performed by: NURSE PRACTITIONER

## 2024-06-04 RX ORDER — BENZTROPINE MESYLATE 1 MG/1
1 TABLET ORAL DAILY
COMMUNITY

## 2024-06-04 RX ORDER — MEGESTROL ACETATE 40 MG/ML
400 SUSPENSION ORAL 2 TIMES DAILY
COMMUNITY
Start: 2024-04-09

## 2024-06-04 RX ORDER — POLYETHYLENE GLYCOL 3350, SODIUM CHLORIDE, SODIUM BICARBONATE, POTASSIUM CHLORIDE 420; 11.2; 5.72; 1.48 G/4L; G/4L; G/4L; G/4L
POWDER, FOR SOLUTION ORAL
COMMUNITY
Start: 2024-05-24

## 2024-06-04 RX ORDER — METHOTREXATE 2.5 MG/1
10 TABLET ORAL
COMMUNITY

## 2024-06-04 RX ORDER — BUPROPION HYDROCHLORIDE 150 MG/1
150 TABLET, EXTENDED RELEASE ORAL 2 TIMES DAILY
COMMUNITY
Start: 2024-04-16

## 2024-06-04 RX ORDER — HYDROXYCHLOROQUINE SULFATE 200 MG/1
200 TABLET, FILM COATED ORAL 2 TIMES DAILY
COMMUNITY

## 2024-06-04 RX ORDER — ARIPIPRAZOLE 400 MG
400 KIT INTRAMUSCULAR
COMMUNITY

## 2024-06-04 NOTE — PROGRESS NOTES
Makayla Veloz DNP, FNP    [facility]  [address] [place] [address] [place] [address]     PATIENT NAME: Ratna Amin  : 1975  DATE: 24  MRN: [id number]      Billing Provider: Makayla Veloz DNP, FNP  Level of Service:   Patient PCP Information       Provider PCP Type    Makayla Veloz DNP, FNP General            Reason for Visit / Chief Complaint: Nicotine Dependence (Pt says she wants help smoking cigars.  She says she quit smoking marijuana and has smokes black and mild cigars all day.  ), Weight Loss (Pt asks if she can be put on something to increase her appetite. She says she has lost 10lb in 6 months. She says she binge eats at times and then will go 3 days without eating.  ), and Health Maintenance (Pt has colonoscopy scheduled on 24)       Update PCP  Update Chief Complaint         History of Present Illness / Problem Focused Workflow     Ratna Amin presents to the clinic with Nicotine Dependence (Pt says she wants help smoking cigars.  She says she quit smoking marijuana and has smokes black and mild cigars all day.  ), Weight Loss (Pt asks if she can be put on something to increase her appetite. She says she has lost 10lb in 6 months. She says she binge eats at times and then will go 3 days without eating.  ), and Health Maintenance (Pt has colonoscopy scheduled on 24)   Pt has not been taking Wellbutrin 2 times a day. Pt has only been taking it once.     Reviewed medications with patient that can cause weight loss.     Has c scope scheduled next week.     Nicotine Dependence  Symptoms are negative for fatigue and sore throat. Her urge triggers include company of smokers.       Review of Systems     Review of Systems   Constitutional:  Negative for activity change, appetite change, chills, fatigue and fever.   HENT:  Negative for nasal congestion, ear pain, hearing loss, postnasal drip and sore throat.    Respiratory:  Negative for cough, chest tightness,  shortness of breath and wheezing.    Cardiovascular:  Negative for chest pain, palpitations, leg swelling and claudication.   Gastrointestinal:  Negative for abdominal pain, change in bowel habit, constipation, diarrhea, nausea and vomiting.   Genitourinary:  Negative for dysuria.   Musculoskeletal:  Negative for arthralgias, back pain, gait problem and myalgias.   Integumentary:  Negative for rash.   Neurological:  Negative for weakness and headaches.   Psychiatric/Behavioral:  Negative for suicidal ideas. The patient is not nervous/anxious.         Medical / Social / Family History     Past Medical History:   Diagnosis Date    Bipolar disorder     Fibromyalgia     Polyarthritis        Past Surgical History:   Procedure Laterality Date    ADENOIDECTOMY  1982    CHOLECYSTECTOMY      FOOT SURGERY Bilateral 03/2023    HERNIA REPAIR  2001    INJECTION OF JOINT Bilateral 1/10/2024    Procedure: INJECTION, JOINT, SHOULDER, HIP, OR KNEE;  Surgeon: Jabier Garza MD;  Location: [facility];  Service: Pain Management;  Laterality: Bilateral;  Bilateral GTB injection    PARTIAL HYSTERECTOMY      TONSILLECTOMY  1982    TUBAL LIGATION         Social History  Ms. Ratna Amin  reports that she has been smoking cigars. She started smoking about 16 months ago. She has been exposed to tobacco smoke. She has never used smokeless tobacco. She reports that she does not currently use drugs. Frequency: 7.00 times per week. She reports that she does not drink alcohol.    Family History  Ms. Ranta Amin's family history includes Cancer in her father and mother; Depression in her father; Diabetes in her mother; Hypertension in her father; Mental illness in her father.    Medications and Allergies     Medications  Outpatient Medications Marked as Taking for the 6/4/24 encounter (Office Visit) with Makayla Veloz, SY, FNP   Medication Sig Dispense Refill    ABILIFY MAINTENA 400 mg sers injection (pre-filled dual  "chamber) Inject 400 mg into the muscle every 28 days.      benztropine (COGENTIN) 1 MG tablet Take 1 mg by mouth once daily.      buPROPion (WELLBUTRIN SR) 150 MG TBSR 12 hr tablet Take 150 mg by mouth 2 (two) times daily.      citalopram (CELEXA) 20 MG tablet Take 20 mg by mouth.      dextroamphetamine-amphetamine 30 mg Tab Take 30 mg by mouth.      folic acid (FOLVITE) 1 MG tablet Take 1 mg by mouth.      hydroxychloroquine (PLAQUENIL) 200 mg tablet Take 200 mg by mouth 2 (two) times daily.      methotrexate (TREXALL) 10 MG tablet Take 10 mg by mouth.      peg-electrolyte soln (NULYTELY WITH FLAVOR PACKS) 420 gram SolR Take by mouth.      [DISCONTINUED] buPROPion (WELLBUTRIN XL) 150 MG TB24 tablet Take 150 mg by mouth once daily.         Allergies  Review of patient's allergies indicates:   Allergen Reactions    Penicillins        Physical Examination     Vitals:    06/04/24 1451   BP: 115/79   BP Location: Left arm   Patient Position: Sitting   BP Method: Medium (Automatic)   Pulse: 79   Resp: 18   Temp: 98.6 °F (37 °C)   TempSrc: Oral   SpO2: 98%   Weight: 57.5 kg (126 lb 12.8 oz)   Height: 5' 9" (1.753 m)     Physical Exam  Vitals and nursing note reviewed.   Constitutional:       General: She is not in acute distress.  HENT:      Nose: Nose normal.      Mouth/Throat:      Mouth: Mucous membranes are moist.   Eyes:      Pupils: Pupils are equal, round, and reactive to light.   Cardiovascular:      Rate and Rhythm: Normal rate and regular rhythm.      Pulses: Normal pulses.      Heart sounds: Normal heart sounds. No murmur heard.  Pulmonary:      Effort: Pulmonary effort is normal. No respiratory distress.      Breath sounds: Normal breath sounds. No wheezing, rhonchi or rales.   Chest:      Chest wall: No tenderness.   Abdominal:      General: Bowel sounds are normal.      Palpations: Abdomen is soft.   Musculoskeletal:         General: Normal range of motion.      Cervical back: Normal range of motion and neck " supple.      Right lower leg: No edema.      Left lower leg: No edema.   Skin:     General: Skin is warm and dry.   Neurological:      General: No focal deficit present.      Mental Status: She is alert and oriented to person, place, and time.          Assessment and Plan (including Health Maintenance)      Problem List  Smart Sets  Document Outside HM   :    Plan:         Health Maintenance Due   Topic Date Due    Colorectal Cancer Screening  Never done    COVID-19 Vaccine (3 - 2023-24 season) 09/01/2023       Problem List Items Addressed This Visit    None  Visit Diagnoses       Tobacco dependence syndrome    -  Primary    Mildly underweight adult              Tobacco dependence syndrome    Mildly underweight adult       Health Maintenance Topics with due status: Not Due       Topic Last Completion Date    TETANUS VACCINE 10/13/2017    Lipid Panel 09/13/2023    Mammogram 10/24/2023         Future Appointments   Date Time Provider Department Center   4/11/2025  3:00 PM AWV NURSE, [facility] KATIE Cooper        Follow up in about 3 months (around 9/4/2024).     Signature:  Makayla Veloz DNP, FNP  [address] [place] [place] [address]  Phone #: [phone]  Fax #: [phone]    Date of encounter: 6/4/24    Patient Instructions   Take Wellbutrin  mg 2 times a day. Drink 2-3 protein shakes between 5-6 small meals throughout the day. Use positive thinking to help quit smoking.

## 2024-06-04 NOTE — PATIENT INSTRUCTIONS
Take Wellbutrin  mg 2 times a day. Drink 2-3 protein shakes between 5-6 small meals throughout the day. Use positive thinking to help quit smoking.

## 2024-06-13 LAB — CRC RECOMMENDATION EXT: NORMAL

## 2024-07-28 ENCOUNTER — HOSPITAL ENCOUNTER (EMERGENCY)
Facility: HOSPITAL | Age: 49
Discharge: HOME | End: 2024-07-28
Payer: MEDICARE

## 2024-07-28 VITALS
RESPIRATION RATE: 18 BRPM | TEMPERATURE: 98 F | BODY MASS INDEX: 18.51 KG/M2 | DIASTOLIC BLOOD PRESSURE: 76 MMHG | OXYGEN SATURATION: 98 % | HEART RATE: 77 BPM | SYSTOLIC BLOOD PRESSURE: 112 MMHG | HEIGHT: 69 IN | WEIGHT: 125 LBS

## 2024-07-28 DIAGNOSIS — M06.9 RHEUMATOID ARTHRITIS FLARE: Primary | ICD-10-CM

## 2024-07-28 PROCEDURE — 99284 EMERGENCY DEPT VISIT MOD MDM: CPT

## 2024-07-28 PROCEDURE — 96372 THER/PROPH/DIAG INJ SC/IM: CPT | Performed by: NURSE PRACTITIONER

## 2024-07-28 PROCEDURE — 63600175 PHARM REV CODE 636 W HCPCS: Performed by: NURSE PRACTITIONER

## 2024-07-28 PROCEDURE — 99284 EMERGENCY DEPT VISIT MOD MDM: CPT | Mod: 25

## 2024-07-28 RX ORDER — METHYLPREDNISOLONE SOD SUCC 125 MG
125 VIAL (EA) INJECTION
Status: DISCONTINUED | OUTPATIENT
Start: 2024-07-28 | End: 2024-07-28

## 2024-07-28 RX ORDER — PREDNISONE 10 MG/1
20 TABLET ORAL DAILY
Qty: 10 TABLET | Refills: 0 | Status: SHIPPED | OUTPATIENT
Start: 2024-07-28 | End: 2024-07-28

## 2024-07-28 RX ORDER — METHYLPREDNISOLONE SOD SUCC 125 MG
125 VIAL (EA) INJECTION
Status: COMPLETED | OUTPATIENT
Start: 2024-07-28 | End: 2024-07-28

## 2024-07-28 RX ORDER — PREDNISONE 10 MG/1
20 TABLET ORAL DAILY
Qty: 10 TABLET | Refills: 0 | Status: SHIPPED | OUTPATIENT
Start: 2024-07-28 | End: 2024-08-02

## 2024-07-28 RX ORDER — KETOROLAC TROMETHAMINE 30 MG/ML
30 INJECTION, SOLUTION INTRAMUSCULAR; INTRAVENOUS
Status: COMPLETED | OUTPATIENT
Start: 2024-07-28 | End: 2024-07-28

## 2024-07-28 RX ADMIN — KETOROLAC TROMETHAMINE 30 MG: 30 INJECTION, SOLUTION INTRAMUSCULAR at 04:07

## 2024-07-28 RX ADMIN — METHYLPREDNISOLONE SODIUM SUCCINATE 125 MG: 125 INJECTION INTRAMUSCULAR; INTRAVENOUS at 04:07

## 2024-07-28 NOTE — ED PROVIDER NOTES
"Encounter Date: 7/28/2024       History     Chief Complaint   Patient presents with    Joint Pain     Pt is a 50 y/o female with complaint of Bilateral hand pain and multiple joint  pains onset 5 days ago. Pt reports she recently stopped taking plaquenil ( worried about side effects). She has HX of RA , she is followed by rheumatology and has appointment on Aug 7, 2024. She denies fever chill malaise decreased appetite        Review of patient's allergies indicates:   Allergen Reactions    Penicillins      Past Medical History:   Diagnosis Date    Bipolar disorder     Fibromyalgia     Polyarthritis      Past Surgical History:   Procedure Laterality Date    ADENOIDECTOMY  1982    CHOLECYSTECTOMY      FOOT SURGERY Bilateral 03/2023    HERNIA REPAIR  2001    INJECTION OF JOINT Bilateral 1/10/2024    Procedure: INJECTION, JOINT, SHOULDER, HIP, OR KNEE;  Surgeon: Jabier Garza MD;  Location: [facility];  Service: Pain Management;  Laterality: Bilateral;  Bilateral GTB injection    PARTIAL HYSTERECTOMY      TONSILLECTOMY  1982    TUBAL LIGATION       Family History   Problem Relation Name Age of Onset    Diabetes Mother Joy     Cancer Mother Joy     Cancer Father Arturo     Mental illness Father Arturo     Hypertension Father Arturo     Depression Father Arturo      Social History     Tobacco Use    Smoking status: Every Day     Types: Cigars     Start date: 2/1/2023     Passive exposure: Current    Smokeless tobacco: Never    Tobacco comments:     She [place] on the [place] quit program. She just got a counselor and they are supposed to sending her patches and gum.       Update: 6/4/24 - she quit patches and gum due to them making her feel "jittery"   Substance Use Topics    Alcohol use: Never    Drug use: Not Currently     Frequency: 7.0 times per week     Comment: says she stopped marijuana around 5/13/24     Review of Systems   Constitutional:  Negative for fever.   HENT:  Negative for sore throat.    Respiratory:  " Negative for shortness of breath.    Cardiovascular:  Negative for chest pain.   Gastrointestinal:  Negative for nausea.   Genitourinary:  Negative for dysuria.   Musculoskeletal:  Positive for arthralgias, joint swelling and myalgias. Negative for back pain.   Skin:  Negative for rash.   Neurological:  Negative for weakness.   Hematological:  Does not bruise/bleed easily.       Physical Exam     Initial Vitals [07/28/24 1559]   BP Pulse Resp Temp SpO2   112/76 77 18 98.1 °F (36.7 °C) 98 %      MAP       --         Physical Exam    Constitutional: She appears well-developed and well-nourished.   HENT:   Nose: Nose normal.   Mouth/Throat: Oropharynx is clear and moist and mucous membranes are normal.   Neck: Neck supple.   Cardiovascular:  Normal rate, regular rhythm and normal pulses.           Musculoskeletal:      Cervical back: Neck supple.      Comments: Bialteral hand pain and pain with movement of knees     Neurological: She is oriented to person, place, and time.   Skin: Skin is warm, dry and intact.         Medical Screening Exam   See Full Note    ED Course   Procedures  Labs Reviewed - No data to display       Imaging Results    None          Medications   ketorolac injection 30 mg (30 mg Intramuscular Given 7/28/24 1632)   methylPREDNISolone sodium succinate injection 125 mg (125 mg Intramuscular Given 7/28/24 1632)     Medical Decision Making  [facility]    Patient presents for emergent evaluation of acute RA that poses a threat to life and/or bodily function.    In the ED patient found to have acute RA Flare.        Discharge MDM    Patient was managed in the ED with  solu medrol 125 IM.    The response to treatment was toelrated.    Patient was discharged in stable condition.  Detailed return precautions discussed.      Risk  Prescription drug management.                                      Clinical Impression:   Final diagnoses:  [M06.9] Rheumatoid arthritis flare (Primary)        ED Disposition Condition     Discharge Stable          ED Prescriptions       Medication Sig Dispense Start Date End Date Auth. Provider    predniSONE (DELTASONE) 10 MG tablet  (Status: Discontinued) Take 2 tablets (20 mg total) by mouth once daily. Take 4 tabs x 3 days, then take 2 tabs x 3 days, then take 1 tab x 3 days. for 5 days 10 tablet 7/28/2024 7/28/2024 Stacey Araiza FNP    predniSONE (DELTASONE) 10 MG tablet Take 2 tablets (20 mg total) by mouth once daily. for 5 days 10 tablet 7/28/2024 8/2/2024 Stacey Araiza FNP          Follow-up Information       Follow up With Specialties Details Why Contact Info    Makayla Veloz, SY, [facility] Family Medicine, Emergency Medicine Schedule an appointment as soon as possible for a visit in 2 days  [address] [facility] [place] [address]  [phone]               Stacey Araiza FNP  07/28/24 9538

## 2024-09-18 ENCOUNTER — OFFICE VISIT (OUTPATIENT)
Dept: FAMILY MEDICINE | Facility: CLINIC | Age: 49
End: 2024-09-18
Payer: MEDICARE

## 2024-09-18 VITALS
BODY MASS INDEX: 19.4 KG/M2 | SYSTOLIC BLOOD PRESSURE: 106 MMHG | DIASTOLIC BLOOD PRESSURE: 74 MMHG | HEART RATE: 94 BPM | TEMPERATURE: 98 F | RESPIRATION RATE: 18 BRPM | WEIGHT: 131 LBS | OXYGEN SATURATION: 98 % | HEIGHT: 69 IN

## 2024-09-18 DIAGNOSIS — Z12.31 ENCOUNTER FOR SCREENING MAMMOGRAM FOR MALIGNANT NEOPLASM OF BREAST: Primary | ICD-10-CM

## 2024-09-18 DIAGNOSIS — Z23 ENCOUNTER FOR VACCINATION: ICD-10-CM

## 2024-09-18 PROCEDURE — 3008F BODY MASS INDEX DOCD: CPT | Mod: ,,, | Performed by: NURSE PRACTITIONER

## 2024-09-18 PROCEDURE — 1159F MED LIST DOCD IN RCRD: CPT | Mod: ,,, | Performed by: NURSE PRACTITIONER

## 2024-09-18 PROCEDURE — 99214 OFFICE O/P EST MOD 30 MIN: CPT | Mod: 25,,, | Performed by: NURSE PRACTITIONER

## 2024-09-18 PROCEDURE — 3074F SYST BP LT 130 MM HG: CPT | Mod: ,,, | Performed by: NURSE PRACTITIONER

## 2024-09-18 PROCEDURE — 1160F RVW MEDS BY RX/DR IN RCRD: CPT | Mod: ,,, | Performed by: NURSE PRACTITIONER

## 2024-09-18 PROCEDURE — 3078F DIAST BP <80 MM HG: CPT | Mod: ,,, | Performed by: NURSE PRACTITIONER

## 2024-09-18 PROCEDURE — 90471 IMMUNIZATION ADMIN: CPT | Mod: ,,, | Performed by: NURSE PRACTITIONER

## 2024-09-18 PROCEDURE — 90636 HEP A/HEP B VACC ADULT IM: CPT | Mod: ,,, | Performed by: NURSE PRACTITIONER

## 2024-09-18 RX ORDER — OMEPRAZOLE 40 MG/1
40 CAPSULE, DELAYED RELEASE ORAL EVERY MORNING
COMMUNITY
Start: 2024-06-13

## 2024-09-18 RX ORDER — PREDNISONE 10 MG/1
10 TABLET ORAL DAILY PRN
COMMUNITY
Start: 2024-08-07

## 2024-09-18 RX ORDER — HYOSCYAMINE SULFATE 0.12 MG/1
0.125-0.25 TABLET, ORALLY DISINTEGRATING ORAL EVERY 4 HOURS PRN
COMMUNITY
Start: 2024-06-13 | End: 2024-09-21

## 2024-09-18 RX ORDER — ARIPIPRAZOLE 400 MG
400 KIT INTRAMUSCULAR
COMMUNITY
Start: 2024-08-26

## 2024-09-18 RX ORDER — CYPROHEPTADINE HYDROCHLORIDE 4 MG/1
8 TABLET ORAL 2 TIMES DAILY
COMMUNITY
Start: 2024-07-24

## 2024-09-18 RX ORDER — GABAPENTIN 600 MG/1
600 TABLET ORAL 2 TIMES DAILY
COMMUNITY
Start: 2024-08-07 | End: 2024-09-21

## 2024-09-18 RX ORDER — ETODOLAC 500 MG/1
500 TABLET, FILM COATED ORAL 2 TIMES DAILY PRN
COMMUNITY
Start: 2024-08-07 | End: 2024-09-21

## 2024-09-18 NOTE — LETTER
AUTHORIZATION FOR RELEASE OF   CONFIDENTIAL INFORMATION    Dear Matt Kong,    We are seeing Ratna Amin, date of birth 1975, in the clinic at [facility]. Makayla Veloz DNP, [place] is the patient's PCP. Ratna Amin has an outstanding lab/procedure at the time we reviewed her chart. In order to help keep her health information updated, she has authorized us to request the following medical record(s):        (  )  MAMMOGRAM                                      ( X )  COLONOSCOPY      (  )  PAP SMEAR                                          (  )  OUTSIDE LAB RESULTS     (  )  DEXA SCAN                                          (  )  EYE EXAM            (  )  FOOT EXAM                                          (  )  ENTIRE RECORD     (  )  OUTSIDE IMMUNIZATIONS                 (  )  _______________         Please fax records to Makayla Veloz, SY, [place], at [phone]     If you have any questions, please contact Monika Ayers RN, at [phone].           Patient Name: Ratna Amin  : 1975  Patient Phone #: [phone]

## 2024-09-18 NOTE — PROGRESS NOTES
Makayla Veloz DNP, FNP    [address] [place] [address]     PATIENT NAME: Ratna Amin  : 1975  DATE: 24  MRN: [id number]      Billing Provider: Makayla Veloz DNP, FNP  Level of Service:   Patient PCP Information       Provider PCP Type    Makayla Veloz DNP, FNP General            Reason for Visit / Chief Complaint: Health Maintenance (Pt came to get her regular screenings and vaccines done.  She says she has been approved to get Hep B vaccine.  Cscope was done at [facility] - sending for record.  Mammo order placed which is due in October.  )       Update PCP  Update Chief Complaint         History of Present Illness / Problem Focused Workflow     Ratna Amin presents to the clinic with Health Maintenance (Pt came to get her regular screenings and vaccines done.  She says she has been approved to get Hep B vaccine.  Cscope was done at [facility] - sending for record.  Mammo order placed which is due in October.  )         Review of Systems     Review of Systems   Constitutional:  Negative for activity change, appetite change, chills, fatigue and fever.   HENT:  Negative for nasal congestion, ear pain, hearing loss, postnasal drip and sore throat.    Respiratory:  Negative for cough, chest tightness, shortness of breath and wheezing.    Cardiovascular:  Negative for chest pain, palpitations, leg swelling and claudication.   Gastrointestinal:  Negative for abdominal pain, change in bowel habit, constipation, diarrhea, nausea and vomiting.   Genitourinary:  Negative for dysuria.   Musculoskeletal:  Negative for arthralgias, back pain, gait problem and myalgias.   Integumentary:  Negative for rash.   Neurological:  Negative for weakness and headaches.   Psychiatric/Behavioral:  Negative for suicidal ideas. The patient is not nervous/anxious.         Medical / Social / Family History     Past Medical History:   Diagnosis Date    Bipolar disorder      Fibromyalgia     Polyarthritis        Past Surgical History:   Procedure Laterality Date    ADENOIDECTOMY  1982    CHOLECYSTECTOMY      FOOT SURGERY Bilateral 03/2023    HERNIA REPAIR  2001    HYSTERECTOMY  2015    INJECTION OF JOINT Bilateral 01/10/2024    Procedure: INJECTION, JOINT, SHOULDER, HIP, OR KNEE;  Surgeon: Jabier Garza MD;  Location: [facility];  Service: Pain Management;  Laterality: Bilateral;  Bilateral GTB injection    PARTIAL HYSTERECTOMY      TONSILLECTOMY  1982    TUBAL LIGATION         Social History  Ms. Ratna Amin  reports that she has been smoking cigars. She started smoking about 19 months ago. She has been exposed to tobacco smoke. She has never used smokeless tobacco. She reports current drug use. Frequency: 7.00 times per week. Drug: Marijuana. She reports that she does not drink alcohol.    Family History  Ms. Ratna Amin's family history includes Cancer in her father and mother; Depression in her father; Diabetes in her mother; Hypertension in her father; Mental illness in her father.    Medications and Allergies     Medications  Outpatient Medications Marked as Taking for the 9/18/24 encounter (Office Visit) with Makayla Veloz, SY, FNP   Medication Sig Dispense Refill    ABILIFY MAINTENA 400 mg SSRR injection (single-use vial) Inject 400 mg into the muscle every 28 days.      benztropine (COGENTIN) 1 MG tablet Take 1 mg by mouth once daily.      buPROPion (WELLBUTRIN SR) 150 MG TBSR 12 hr tablet Take 150 mg by mouth 2 (two) times daily.      citalopram (CELEXA) 20 MG tablet Take 20 mg by mouth.      cyproheptadine (PERIACTIN) 4 mg tablet Take 8 mg by mouth 2 (two) times daily.      dextroamphetamine-amphetamine 30 mg Tab Take 30 mg by mouth.      etodolac (LODINE) 500 MG tablet Take 500 mg by mouth 2 (two) times daily as needed (pain).      folic acid (FOLVITE) 1 MG tablet Take 1 mg by mouth.      gabapentin (NEURONTIN) 600 MG tablet Take 600 mg by mouth  "2 (two) times daily.      hydroxychloroquine (PLAQUENIL) 200 mg tablet Take 200 mg by mouth 2 (two) times daily.      LEVSIN/SL 0.125 mg Subl Place 0.125-0.25 mg under the tongue every 4 (four) hours as needed.      methotrexate (TREXALL) 10 MG tablet Take 10 mg by mouth once a week.      omeprazole (PRILOSEC) 40 MG capsule Take 40 mg by mouth every morning.      predniSONE (DELTASONE) 10 MG tablet Take 10 mg by mouth daily as needed.         Allergies  Review of patient's allergies indicates:   Allergen Reactions    Penicillins        Physical Examination     Vitals:    09/18/24 1510   BP: 106/74   BP Location: Left arm   Patient Position: Sitting   BP Method: Medium (Automatic)   Pulse: 94   Resp: 18   Temp: 98.3 °F (36.8 °C)   TempSrc: Oral   SpO2: 98%   Weight: 59.4 kg (131 lb)   Height: 5' 9" (1.753 m)     Physical Exam  Vitals and nursing note reviewed.   Constitutional:       General: She is not in acute distress.  HENT:      Nose: Nose normal.      Mouth/Throat:      Mouth: Mucous membranes are moist.   Eyes:      Pupils: Pupils are equal, round, and reactive to light.   Cardiovascular:      Rate and Rhythm: Normal rate and regular rhythm.      Pulses: Normal pulses.      Heart sounds: Normal heart sounds. No murmur heard.  Pulmonary:      Effort: Pulmonary effort is normal. No respiratory distress.      Breath sounds: Normal breath sounds. No wheezing, rhonchi or rales.   Chest:      Chest wall: No tenderness.   Abdominal:      General: Bowel sounds are normal.      Palpations: Abdomen is soft.   Musculoskeletal:         General: Normal range of motion.      Cervical back: Normal range of motion and neck supple.      Right lower leg: No edema.      Left lower leg: No edema.   Skin:     General: Skin is warm and dry.   Neurological:      General: No focal deficit present.      Mental Status: She is alert and oriented to person, place, and time.          Assessment and Plan (including Health Maintenance)      " Problem List  Smart Sets  Document Outside HM   :    Plan:         Health Maintenance Due   Topic Date Due    Colorectal Cancer Screening  Never done    Influenza Vaccine (1) 09/01/2024    COVID-19 Vaccine (3 - 2023-24 season) 09/01/2024    Mammogram  10/24/2024       Problem List Items Addressed This Visit    None  Visit Diagnoses       Encounter for screening mammogram for malignant neoplasm of breast    -  Primary    Relevant Orders    Mammo Digital Screening Bilat w/ Diego    Encounter for vaccination        Relevant Medications    hepatitis B virus vacc.rec(PF) injection 20 mcg (Completed)          Encounter for screening mammogram for malignant neoplasm of breast  -     Mammo Digital Screening Bilat w/ Diego; Future; Expected date: 09/18/2024    Encounter for vaccination  -     hepatitis B virus vacc.rec(PF) injection 20 mcg       Health Maintenance Topics with due status: Not Due       Topic Last Completion Date    TETANUS VACCINE 10/13/2017    Lipid Panel 09/13/2023         Future Appointments   Date Time Provider Department Center   10/18/2024 10:00 AM NURSE, [facility] [place]   3/18/2025  4:00 PM NURSE, [facility] [place]   4/11/2025  3:00 PM AWV NURSE, [facility] [place]        Follow up in about 6 months (around 3/18/2025).     Signature:  Makayla Veloz DNP, FNP  [address] [place] [address]  Phone #: [phone]  Fax #: [phone]    Date of encounter: 9/18/24    Patient Instructions   Continue current medication regimen. Recommend monthly breast exams. Recommend exercise 30 minutes per day most days of the week. Follow up in 6 months for chronic medical problems.

## 2024-09-21 NOTE — PATIENT INSTRUCTIONS
Continue current medication regimen. Recommend monthly breast exams. Recommend exercise 30 minutes per day most days of the week. Follow up in 6 months for chronic medical problems.

## 2024-09-25 ENCOUNTER — PATIENT OUTREACH (OUTPATIENT)
Facility: HOSPITAL | Age: 49
End: 2024-09-25
Payer: MEDICARE

## 2024-09-25 NOTE — PROGRESS NOTES
Population Health Chart Review & Patient Outreach Details    Updates Requested / Reviewed:  [x]  Care Team Updated    Health Maintenance Topics Addressed and Outreach Outcomes / Actions Taken:  Colon Cancer Screening [x] HM Updated with June 2024 Colonoscopy (Dr. Baird). History Updated.

## 2024-10-18 ENCOUNTER — CLINICAL SUPPORT (OUTPATIENT)
Dept: FAMILY MEDICINE | Facility: CLINIC | Age: 49
End: 2024-10-18
Payer: MEDICARE

## 2024-10-18 DIAGNOSIS — Z23 ENCOUNTER FOR IMMUNIZATION: Primary | ICD-10-CM

## 2024-10-18 PROCEDURE — 90636 HEP A/HEP B VACC ADULT IM: CPT | Mod: ,,, | Performed by: FAMILY MEDICINE

## 2024-10-18 PROCEDURE — 90656 IIV3 VACC NO PRSV 0.5 ML IM: CPT | Mod: ,,, | Performed by: FAMILY MEDICINE

## 2024-10-18 PROCEDURE — 90472 IMMUNIZATION ADMIN EACH ADD: CPT | Mod: ,,, | Performed by: FAMILY MEDICINE

## 2024-10-18 PROCEDURE — G0008 ADMIN INFLUENZA VIRUS VAC: HCPCS | Mod: ,,, | Performed by: FAMILY MEDICINE

## 2024-10-18 NOTE — PROGRESS NOTES
Patient came in to clinic today to get her second Hep B vaccine and a flu shot. She was given Energix B (hep B) 0.5 ml IM in right deltoid. She was also given Fluarix 0.5 ml IM in left deltoid. Patient waitd 15 minute shot time in clinic with no adverse reaction noted.

## 2024-11-08 LAB — BCS RECOMMENDATION EXT: NORMAL

## 2024-11-11 ENCOUNTER — PATIENT OUTREACH (OUTPATIENT)
Facility: HOSPITAL | Age: 49
End: 2024-11-11
Payer: MEDICARE

## 2024-11-11 NOTE — PROGRESS NOTES
Population Health Chart Review & Patient Outreach Details    Health Maintenance Topics Addressed and Outreach Outcomes / Actions Taken:  Breast Cancer Screening [x] HM Updated with November 2024 Mammogram ([facility]). History Updated.

## 2025-03-18 ENCOUNTER — CLINICAL SUPPORT (OUTPATIENT)
Dept: FAMILY MEDICINE | Facility: CLINIC | Age: 50
End: 2025-03-18
Payer: MEDICARE

## 2025-03-18 DIAGNOSIS — Z23 ENCOUNTER FOR IMMUNIZATION: Primary | ICD-10-CM

## 2025-03-18 PROCEDURE — 90746 HEPB VACCINE 3 DOSE ADULT IM: CPT | Mod: ,,, | Performed by: NURSE PRACTITIONER

## 2025-03-18 PROCEDURE — G0010 ADMIN HEPATITIS B VACCINE: HCPCS | Mod: ,,, | Performed by: NURSE PRACTITIONER

## 2025-03-18 NOTE — PROGRESS NOTES
Patient is here her her Hepatis injection today. Patient was given Hepatitis B injection in left upper arm. Patient was instructed to wait in clinic for 15 minutes. Patient left before being checked out.

## (undated) DEVICE — TOWEL OR DISP STRL BLUE 4/PK

## (undated) DEVICE — TRAY NERVE BLOCK UNIV 10/CA

## (undated) DEVICE — APPLICATOR CHLORAPREP ORN 26ML

## (undated) DEVICE — CATH IV 22G X 1 AUTOGUARD

## (undated) DEVICE — KIT IV START RUSH

## (undated) DEVICE — GLOVE PROTEXIS PI SYN SURG 7.5

## (undated) DEVICE — SLIPPER FALL PREV YELLOW XLG

## (undated) DEVICE — SOL CONTINU-FLO SET 2 LAV

## (undated) DEVICE — NDL SPINAL SPINOCAN 22GX3.5

## (undated) DEVICE — GLOVE PROTEXIS PI SYN SURG 6.5

## (undated) DEVICE — SET EXTENSION CLEARLINK 2INJ